# Patient Record
Sex: FEMALE | Race: WHITE | NOT HISPANIC OR LATINO | Employment: FULL TIME | URBAN - METROPOLITAN AREA
[De-identification: names, ages, dates, MRNs, and addresses within clinical notes are randomized per-mention and may not be internally consistent; named-entity substitution may affect disease eponyms.]

---

## 2021-04-20 ENCOUNTER — HOSPITAL ENCOUNTER (OUTPATIENT)
Dept: GENERAL RADIOLOGY | Facility: HOSPITAL | Age: 46
Discharge: HOME | End: 2021-04-20
Attending: OBSTETRICS & GYNECOLOGY

## 2022-04-26 ENCOUNTER — OFFICE VISIT (OUTPATIENT)
Dept: INTERNAL MEDICINE | Facility: CLINIC | Age: 47
End: 2022-04-26

## 2022-04-26 ENCOUNTER — LAB (OUTPATIENT)
Dept: LAB | Facility: HOSPITAL | Age: 47
End: 2022-04-26

## 2022-04-26 VITALS
WEIGHT: 202.8 LBS | BODY MASS INDEX: 34.62 KG/M2 | DIASTOLIC BLOOD PRESSURE: 96 MMHG | OXYGEN SATURATION: 96 % | TEMPERATURE: 97.7 F | HEART RATE: 75 BPM | SYSTOLIC BLOOD PRESSURE: 140 MMHG | HEIGHT: 64 IN

## 2022-04-26 DIAGNOSIS — R51.9 HEADACHE ABOVE THE EYE REGION: ICD-10-CM

## 2022-04-26 DIAGNOSIS — J01.00 ACUTE MAXILLARY SINUSITIS, RECURRENCE NOT SPECIFIED: ICD-10-CM

## 2022-04-26 DIAGNOSIS — R03.0 ELEVATED BLOOD PRESSURE READING: ICD-10-CM

## 2022-04-26 DIAGNOSIS — E78.5 HYPERLIPIDEMIA, UNSPECIFIED HYPERLIPIDEMIA TYPE: ICD-10-CM

## 2022-04-26 DIAGNOSIS — E53.8 VITAMIN B12 DEFICIENCY: ICD-10-CM

## 2022-04-26 DIAGNOSIS — E55.9 VITAMIN D DEFICIENCY: ICD-10-CM

## 2022-04-26 DIAGNOSIS — R03.0 ELEVATED BLOOD PRESSURE READING: Primary | ICD-10-CM

## 2022-04-26 DIAGNOSIS — N95.1 PERIMENOPAUSAL: ICD-10-CM

## 2022-04-26 LAB
25(OH)D3 SERPL-MCNC: 13.4 NG/ML (ref 30–100)
ALBUMIN SERPL-MCNC: 4.5 G/DL (ref 3.5–5.2)
ALBUMIN UR-MCNC: <1.2 MG/DL
ALBUMIN/GLOB SERPL: 1.6 G/DL
ALP SERPL-CCNC: 93 U/L (ref 39–117)
ALT SERPL W P-5'-P-CCNC: 41 U/L (ref 1–33)
ANION GAP SERPL CALCULATED.3IONS-SCNC: 10.4 MMOL/L (ref 5–15)
AST SERPL-CCNC: 26 U/L (ref 1–32)
BASOPHILS # BLD AUTO: 0.05 10*3/MM3 (ref 0–0.2)
BASOPHILS NFR BLD AUTO: 1 % (ref 0–1.5)
BILIRUB SERPL-MCNC: 0.3 MG/DL (ref 0–1.2)
BUN SERPL-MCNC: 10 MG/DL (ref 6–20)
BUN/CREAT SERPL: 13.7 (ref 7–25)
CALCIUM SPEC-SCNC: 9.7 MG/DL (ref 8.6–10.5)
CHLORIDE SERPL-SCNC: 103 MMOL/L (ref 98–107)
CHOLEST SERPL-MCNC: 232 MG/DL (ref 0–200)
CO2 SERPL-SCNC: 25.6 MMOL/L (ref 22–29)
CREAT SERPL-MCNC: 0.73 MG/DL (ref 0.57–1)
CREAT UR-MCNC: 62.8 MG/DL
DEPRECATED RDW RBC AUTO: 40 FL (ref 37–54)
EGFRCR SERPLBLD CKD-EPI 2021: 101.6 ML/MIN/1.73
EOSINOPHIL # BLD AUTO: 0.13 10*3/MM3 (ref 0–0.4)
EOSINOPHIL NFR BLD AUTO: 2.7 % (ref 0.3–6.2)
ERYTHROCYTE [DISTWIDTH] IN BLOOD BY AUTOMATED COUNT: 12.4 % (ref 12.3–15.4)
FOLATE SERPL-MCNC: 6.42 NG/ML (ref 4.78–24.2)
FSH SERPL-ACNC: 39.9 MIU/ML
GLOBULIN UR ELPH-MCNC: 2.8 GM/DL
GLUCOSE SERPL-MCNC: 104 MG/DL (ref 65–99)
HCT VFR BLD AUTO: 41 % (ref 34–46.6)
HDLC SERPL-MCNC: 46 MG/DL (ref 40–60)
HGB BLD-MCNC: 13.9 G/DL (ref 12–15.9)
IMM GRANULOCYTES # BLD AUTO: 0.02 10*3/MM3 (ref 0–0.05)
IMM GRANULOCYTES NFR BLD AUTO: 0.4 % (ref 0–0.5)
LDLC SERPL CALC-MCNC: 156 MG/DL (ref 0–100)
LDLC/HDLC SERPL: 3.32 {RATIO}
LH SERPL-ACNC: 26.9 MIU/ML
LYMPHOCYTES # BLD AUTO: 1.43 10*3/MM3 (ref 0.7–3.1)
LYMPHOCYTES NFR BLD AUTO: 29.9 % (ref 19.6–45.3)
MAGNESIUM SERPL-MCNC: 2 MG/DL (ref 1.6–2.6)
MCH RBC QN AUTO: 30 PG (ref 26.6–33)
MCHC RBC AUTO-ENTMCNC: 33.9 G/DL (ref 31.5–35.7)
MCV RBC AUTO: 88.6 FL (ref 79–97)
MICROALBUMIN/CREAT UR: NORMAL MG/G{CREAT}
MONOCYTES # BLD AUTO: 0.43 10*3/MM3 (ref 0.1–0.9)
MONOCYTES NFR BLD AUTO: 9 % (ref 5–12)
NEUTROPHILS NFR BLD AUTO: 2.72 10*3/MM3 (ref 1.7–7)
NEUTROPHILS NFR BLD AUTO: 57 % (ref 42.7–76)
NRBC BLD AUTO-RTO: 0 /100 WBC (ref 0–0.2)
PLATELET # BLD AUTO: 323 10*3/MM3 (ref 140–450)
PMV BLD AUTO: 9.9 FL (ref 6–12)
POTASSIUM SERPL-SCNC: 3.9 MMOL/L (ref 3.5–5.2)
PROT SERPL-MCNC: 7.3 G/DL (ref 6–8.5)
RBC # BLD AUTO: 4.63 10*6/MM3 (ref 3.77–5.28)
SODIUM SERPL-SCNC: 139 MMOL/L (ref 136–145)
T3FREE SERPL-MCNC: 3.74 PG/ML (ref 2–4.4)
T4 FREE SERPL-MCNC: 1.09 NG/DL (ref 0.93–1.7)
TRIGL SERPL-MCNC: 166 MG/DL (ref 0–150)
TSH SERPL DL<=0.05 MIU/L-ACNC: 1.66 UIU/ML (ref 0.27–4.2)
VIT B12 BLD-MCNC: 393 PG/ML (ref 211–946)
VLDLC SERPL-MCNC: 30 MG/DL (ref 5–40)
WBC NRBC COR # BLD: 4.78 10*3/MM3 (ref 3.4–10.8)

## 2022-04-26 PROCEDURE — 82043 UR ALBUMIN QUANTITATIVE: CPT

## 2022-04-26 PROCEDURE — 82306 VITAMIN D 25 HYDROXY: CPT

## 2022-04-26 PROCEDURE — 80061 LIPID PANEL: CPT

## 2022-04-26 PROCEDURE — 83001 ASSAY OF GONADOTROPIN (FSH): CPT

## 2022-04-26 PROCEDURE — 82746 ASSAY OF FOLIC ACID SERUM: CPT

## 2022-04-26 PROCEDURE — 99204 OFFICE O/P NEW MOD 45 MIN: CPT | Performed by: INTERNAL MEDICINE

## 2022-04-26 PROCEDURE — 84439 ASSAY OF FREE THYROXINE: CPT

## 2022-04-26 PROCEDURE — 83002 ASSAY OF GONADOTROPIN (LH): CPT

## 2022-04-26 PROCEDURE — 80050 GENERAL HEALTH PANEL: CPT

## 2022-04-26 PROCEDURE — 82607 VITAMIN B-12: CPT

## 2022-04-26 PROCEDURE — 82570 ASSAY OF URINE CREATININE: CPT

## 2022-04-26 PROCEDURE — 83735 ASSAY OF MAGNESIUM: CPT

## 2022-04-26 PROCEDURE — 36415 COLL VENOUS BLD VENIPUNCTURE: CPT

## 2022-04-26 PROCEDURE — 84481 FREE ASSAY (FT-3): CPT

## 2022-04-26 RX ORDER — HYDROCHLOROTHIAZIDE 12.5 MG/1
12.5 TABLET ORAL DAILY
Qty: 30 TABLET | Refills: 1 | Status: SHIPPED | OUTPATIENT
Start: 2022-04-26 | End: 2022-08-25 | Stop reason: SDUPTHER

## 2022-04-26 RX ORDER — AMOXICILLIN 875 MG/1
TABLET, COATED ORAL
Qty: 20 TABLET | Refills: 0 | Status: SHIPPED | OUTPATIENT
Start: 2022-04-26 | End: 2022-08-25

## 2022-04-26 RX ORDER — HYDROCHLOROTHIAZIDE 12.5 MG/1
12.5 TABLET ORAL DAILY
Qty: 30 TABLET | Refills: 1 | Status: SHIPPED | OUTPATIENT
Start: 2022-04-26 | End: 2022-04-26 | Stop reason: SDUPTHER

## 2022-04-26 RX ORDER — HYDROCHLOROTHIAZIDE 12.5 MG/1
12.5 TABLET ORAL DAILY
Qty: 30 TABLET | Refills: 1 | Status: SHIPPED | OUTPATIENT
Start: 2022-04-26 | End: 2022-04-26

## 2022-04-26 NOTE — ASSESSMENT & PLAN NOTE
Not bothering patient.  We will hold off on clonidine at present if worsens will try this medication at next visit.  Check LH and FSH.

## 2022-04-26 NOTE — PROGRESS NOTES
"CHIEF COMPLAINT  Janneth Adkins presents to [facility] to Establish Care (Patient is here to establish has not seen a physician in about 6 years) and concerned about blood pressure    HPI  47 yo pleasant female, moved from [place], [place] in 2016-needs PCP- to get physical and concerned about high BP. Had eye exam and retina noted to have \"ruptured blood vessels?\"    Also with c/o \"pressure HA\" x 2 weeks,on and off, and itchy eyes-sinusitis?2 days ago started on left eye and moved across forehead- lasted half a day--took decongestant which helped.    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   Medical History: has a past medical history of Allergic (1989), Headache, and HL (hearing loss).   Surgical History: has no past surgical history on file.   Family History: family history includes Anxiety disorder in her son; Arthritis in her paternal aunt; Cancer in her father, maternal aunt, paternal aunt, and paternal grandfather; Diabetes in her father, maternal uncle, and paternal uncle; Heart disease in her maternal grandmother and paternal grandmother; Hyperlipidemia in her father and mother; Liver disease in her father; Stroke in her mother.   Social History: reports that she has never smoked. She does not have any smokeless tobacco history on file. She reports previous alcohol use. She reports that she does not use drugs.  Social History     Social History Narrative   • Not on file         OBJECTIVE  Vital Signs  Vitals:    04/26/22 0728 04/26/22 0808   BP: 133/92 140/96   BP " "Location: Left arm Right arm   Patient Position: Sitting Sitting   Cuff Size: Large Adult Adult   Pulse: 75    Temp: 97.7 °F (36.5 °C)    SpO2: 96%    Weight: 92 kg (202 lb 12.8 oz)    Height: 162.6 cm (64\")       Body mass index is 34.81 kg/m².    Review of Systems -PND/itchy eyes, weight gain 30 lbs x 6 yrs,hot flashes, coryza    Physical Exam  Vitals and nursing note reviewed.   Constitutional:       Appearance: Normal appearance.   HENT:      Head: Normocephalic and atraumatic.      Comments: Slightly tender maxillary sinuses     Right Ear: Tympanic membrane normal.      Left Ear: Tympanic membrane normal.      Mouth/Throat:      Mouth: Mucous membranes are moist.      Pharynx: Posterior oropharyngeal erythema present.   Eyes:      Pupils: Pupils are equal, round, and reactive to light.   Neck:      Comments: No carotid bruits  Cardiovascular:      Rate and Rhythm: Normal rate and regular rhythm.   Pulmonary:      Effort: Pulmonary effort is normal.      Breath sounds: Normal breath sounds.   Abdominal:      Palpations: Abdomen is soft.   Musculoskeletal:      Cervical back: Normal range of motion and neck supple.   Neurological:      General: No focal deficit present.      Mental Status: She is alert and oriented to person, place, and time.         RESULTS REVIEW  No results found for: PROBNP, BNP  CMP    CMP 4/26/22   Glucose 104 (A)   BUN 10   Creatinine 0.73   Sodium 139   Potassium 3.9   Chloride 103   Calcium 9.7   Albumin 4.50   Total Bilirubin 0.3   Alkaline Phosphatase 93   AST (SGOT) 26   ALT (SGPT) 41 (A)   (A) Abnormal value               Lipid Panel    Lipid Panel 4/26/22   Total Cholesterol 232 (A)   Triglycerides 166 (A)   HDL Cholesterol 46   VLDL Cholesterol 30   LDL Cholesterol  156 (A)   LDL/HDL Ratio 3.32   (A) Abnormal value             Lab Results   Component Value Date    TSH 1.660 04/26/2022      Lab Results   Component Value Date    FREET4 1.09 04/26/2022            No Images in the past " 120 days found..              ASSESSMENT & PLAN  Diagnoses and all orders for this visit:    1. Elevated blood pressure reading (Primary)  Assessment & Plan:  Check blood pressure twice a day 5 to 7 days a week for the next 3 to 4 weeks and bring log in at next visit.  Check comp lipids thyroid B12 folic acid vitamin D TSH and urine and EKG for baseline.  Discussed hypertension complications follow a Dash diet exercise i.e. walk 30 minutes daily    Orders:  -     Discontinue: hydroCHLOROthiazide (HYDRODIURIL) 12.5 MG tablet; Take 1 tablet by mouth Daily.  Dispense: 30 tablet; Refill: 1  -     Discontinue: hydroCHLOROthiazide (HYDRODIURIL) 12.5 MG tablet; Take 1 tablet by mouth Daily.  Dispense: 30 tablet; Refill: 1  -     Comprehensive Metabolic Panel; Future  -     Lipid Panel; Future  -     TSH+Free T4; Future  -     T3, Free; Future  -     Vitamin B12; Future  -     Folate; Future  -     Magnesium; Future  -     Vitamin D 25 Hydroxy; Future  -     CBC & Differential; Future  -     Microalbumin / Creatinine Urine Ratio - Urine, Clean Catch; Future  -     ECG 12 Lead; Future    2. Acute maxillary sinusitis, recurrence not specified  Assessment & Plan:  Give amoxicillin 875 mg 1 tablet twice a day for 10 days.    Orders:  -     amoxicillin (AMOXIL) 875 MG tablet; One tab po BID x 10 days  Dispense: 20 tablet; Refill: 0  -     Comprehensive Metabolic Panel; Future  -     Lipid Panel; Future  -     TSH+Free T4; Future  -     T3, Free; Future  -     Vitamin B12; Future  -     Folate; Future  -     Magnesium; Future  -     Vitamin D 25 Hydroxy; Future  -     CBC & Differential; Future  -     Microalbumin / Creatinine Urine Ratio - Urine, Clean Catch; Future  -     ECG 12 Lead; Future    3. Headache above the eye region  Assessment & Plan:  May be multifactorial from allergies blood pressure and sinus infection.  Will monitor at present    Orders:  -     Comprehensive Metabolic Panel; Future  -     Lipid Panel; Future  -      TSH+Free T4; Future  -     T3, Free; Future  -     Vitamin B12; Future  -     Folate; Future  -     Magnesium; Future  -     Vitamin D 25 Hydroxy; Future  -     CBC & Differential; Future  -     Microalbumin / Creatinine Urine Ratio - Urine, Clean Catch; Future  -     ECG 12 Lead; Future    4. Perimenopausal  Assessment & Plan:  Not bothering patient.  We will hold off on clonidine at present if worsens will try this medication at next visit.  Check LH and FSH.    Orders:  -     Comprehensive Metabolic Panel; Future  -     TSH+Free T4; Future  -     T3, Free; Future  -     Vitamin B12; Future  -     Folate; Future  -     Mammo Screening Bilateral With CAD  -     Follicle stimulating hormone; Future  -     Luteinizing hormone; Future    5. Vitamin D deficiency  -     vitamin D (ERGOCALCIFEROL) 1.25 MG (26433 UT) capsule capsule; Take 1 capsule by mouth 1 (One) Time Per Week for 196 days.  Dispense: 14 capsule; Refill: 0    6. Vitamin B12 deficiency  -     Cyanocobalamin (Vitamin B-12) 1000 MCG sublingual tablet; 0ne tab daily 5 days/week  Dispense: 90 tablet; Refill: 1    7. Hyperlipidemia, unspecified hyperlipidemia type  -     atorvastatin (Lipitor) 10 MG tablet; Take 1 tablet by mouth Daily.  Dispense: 30 tablet; Refill: 0    Assessment and plan    Give COVID booster today  Tdap at next visit   Schedule mammogram at next visit needs to wait at least 3 months after COVID booster before can be done  Patient with some coryza and itchy watery eyes but declined medications at present.  Advised that she can use over-the-counter Zaditor eyedrops or cetirizine as needed.    do labs  FOLLOW UP  Return in about 4 weeks (around 5/24/2022) for Next scheduled follow up.     Addendum April 27, 2022  Labs reviewed patient with elevated cholesterol 232,  will start atorvastatin.  Also low normal B12 equals 393 will recommend starting vitamin B12 tablets daily  Vitamin D low equals 13.4- recommend starting high-dose  vitamin D for 3 months and then daily 2000 units.    Patient was given instructions and counseling regarding her condition or for health maintenance advice. Please see specific information pulled into the AVS if appropriate.

## 2022-04-26 NOTE — ASSESSMENT & PLAN NOTE
Check blood pressure twice a day 5 to 7 days a week for the next 3 to 4 weeks and bring log in at next visit.  Check comp lipids thyroid B12 folic acid vitamin D TSH and urine and EKG for baseline.  Discussed hypertension complications follow a Dash diet exercise i.e. walk 30 minutes daily

## 2022-04-27 ENCOUNTER — TELEPHONE (OUTPATIENT)
Dept: INTERNAL MEDICINE | Facility: CLINIC | Age: 47
End: 2022-04-27

## 2022-04-27 ENCOUNTER — PATIENT ROUNDING (BHMG ONLY) (OUTPATIENT)
Dept: INTERNAL MEDICINE | Facility: CLINIC | Age: 47
End: 2022-04-27

## 2022-04-27 RX ORDER — ERGOCALCIFEROL 1.25 MG/1
50000 CAPSULE ORAL WEEKLY
Qty: 14 CAPSULE | Refills: 0 | Status: SHIPPED | OUTPATIENT
Start: 2022-04-27 | End: 2022-11-09

## 2022-04-27 RX ORDER — ATORVASTATIN CALCIUM 10 MG/1
10 TABLET, FILM COATED ORAL DAILY
Qty: 30 TABLET | Refills: 0 | Status: SHIPPED | OUTPATIENT
Start: 2022-04-27 | End: 2022-05-23

## 2022-04-27 RX ORDER — MAGNESIUM 200 MG
TABLET ORAL
Qty: 90 TABLET | Refills: 1 | Status: SHIPPED | OUTPATIENT
Start: 2022-04-27

## 2022-04-27 NOTE — PROGRESS NOTES
April 27, 2022    Hello, may I speak with Janneth Adkins?    My name is Santy Moya      I am  with [facility]  [facility]  [address] [address]  [facility] [id number].    Before we get started may I verify your date of birth? 1975    I am calling to officially welcome you to our practice and ask about your recent visit. Is this a good time to talk? yes    Tell me about your visit with us. What things went well?  the visit went great       We're always looking for ways to make our patients' experiences even better. Do you have recommendations on ways we may improve?  no    Overall were you satisfied with your first visit to our practice? yes       I appreciate you taking the time to speak with me today. Is there anything else I can do for you? no      Thank you, and have a great day.

## 2022-04-27 NOTE — TELEPHONE ENCOUNTER
----- Message from Esha Mosley MD sent at 4/27/2022  9:26 AM EDT -----  Please inform patient that her labs showed a very low vitamin D level.  This can cause fatigue.  I sent a prescription for vitamin D 50,000 units once a week to take for the next 3 months then after that take vitamin D 2000 units daily over-the-counter.  Also her B12 was at the lower limits of normal.  This can also cause fatigue recommend taking vitamin B12 1 daily 5 days a week I also sent this prescription over.  Also her cholesterol levels are elevated cholesterol is 232 the bad cholesterol LDL is 156 that should be in the 130 range- with the elevated blood pressure that she has it would be a good idea to start taking a medicine to help protect the heart.  I sent over atorvastatin which is Lipitor at the lowest dose of 10 mg to take once a day.  Just monitor for any muscle aches.  But this is a low dose and usually less likely to occur.  I will see her back at her scheduled appointment and we will discuss further the atorvastatin and the current dose.  With the family history of the diabetes and stroke in her dad and mother respectively, it would be a good idea to start taking something to protect her heart like this atorvastatin.  Thanks

## 2022-05-12 NOTE — PROGRESS NOTES
"CHIEF COMPLAINT  Janneth Adkins presents to [facility] for follow-up of Follow-up and Labs Only (Labs done. Acid reflex .).and BP    HPI    46-year-old pleasant female here for follow-up from last visit to check elevated blood pressure, new diagnosis of hyperlipidemia, vitamin D deficiency and vitamin B12 deficiency.    Records from last visit -4/26/2263-vmzjesty-VNB/Asessment/Plan:    \"45 yo pleasant female, moved from [place], [place] in 2016-needs PCP- to get physical and concerned about high BP. Had eye exam and retina noted to have \"ruptured blood vessels?\"     Also with c/o \"pressure HA\" x 2 weeks,on and off, and itchy eyes-sinusitis?2 days ago started on left eye and moved across forehead- lasted half a day--took decongestant which helped.     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  She was also started on atorvastatin due to elevated lipids, vitamin B12 tablets, and high-dose vitamin D.    HTN-new Dx-checking BP at home 2x/day- 120s/ 70-93,HA are better now 1x/week-needs tx-    menopausal by labs--LMP-4-5 months-pap pending--will give tx    Vit d def--taking meds    Vit B12 def--taking vit B12-feeling better    elev FF=429--UU of DM in dad     Last pap 4/2021-  PFSH reviewed.  ROS negative    OBJECTIVE  Vital Signs  Vitals:    05/24/22 0724 05/24/22 0754   BP: 132/83 132/90   BP Location: Left arm Right arm   Patient Position: Sitting Sitting   Cuff Size: Large Adult Adult   Pulse: 81    Temp: 97.7 °F (36.5 °C)    SpO2: 98%    Weight: 93.4 kg (205 lb 12.8 oz)  " "  Height: 162.6 cm (64\")       Body mass index is 35.33 kg/m².    Physical Exam  Vitals and nursing note reviewed.   Constitutional:       Appearance: Normal appearance.   HENT:      Head: Normocephalic and atraumatic.   Cardiovascular:      Rate and Rhythm: Normal rate and regular rhythm.   Pulmonary:      Effort: Pulmonary effort is normal.      Breath sounds: Normal breath sounds.   Abdominal:      Palpations: Abdomen is soft.   Musculoskeletal:      Cervical back: Normal range of motion and neck supple.   Neurological:      General: No focal deficit present.      Mental Status: She is alert and oriented to person, place, and time.          RESULTS REVIEW  No results found for: PROBNP, BNP  CMP    CMP 4/26/22   Glucose 104 (A)   BUN 10   Creatinine 0.73   Sodium 139   Potassium 3.9   Chloride 103   Calcium 9.7   Albumin 4.50   Total Bilirubin 0.3   Alkaline Phosphatase 93   AST (SGOT) 26   ALT (SGPT) 41 (A)   (A) Abnormal value            CBC w/diff    CBC w/Diff 4/26/22   WBC 4.78   RBC 4.63   Hemoglobin 13.9   Hematocrit 41.0   MCV 88.6   MCH 30.0   MCHC 33.9   RDW 12.4   Platelets 323   Neutrophil Rel % 57.0   Immature Granulocyte Rel % 0.4   Lymphocyte Rel % 29.9   Monocyte Rel % 9.0   Eosinophil Rel % 2.7   Basophil Rel % 1.0            Lipid Panel    Lipid Panel 4/26/22   Total Cholesterol 232 (A)   Triglycerides 166 (A)   HDL Cholesterol 46   VLDL Cholesterol 30   LDL Cholesterol  156 (A)   LDL/HDL Ratio 3.32   (A) Abnormal value             Lab Results   Component Value Date    TSH 1.660 04/26/2022      Lab Results   Component Value Date    FREET4 1.09 04/26/2022         Lab Results   Component Value Date    GHRNKECY46 393 04/26/2022    ZEES63TK 13.4 (L) 04/26/2022    MG 2.0 04/26/2022        No Images in the past 120 days found..             ASSESSMENT & PLAN  Diagnoses and all orders for this visit:    1. Hypertension, unspecified type (Primary)  Assessment & Plan:  New diagnosis.  Discussed lifestyle " changes.  Discussed hypertension complications today and at last visit.  Patient with complaint of symptomatic hot flashes for years.  Will treat with clonidine 0.1 mg twice daily to help with the symptoms.  Explained that this is not the first-line for high blood pressure.  Patient will work on lifestyle changes.  Patient also with positive family history of diabetes in her dad.  May consider adding ACE inhibitor at next visit and checking A1c then.  Declined checking A1c today.      2. Other hyperlipidemia  Assessment & Plan:  Tolerating atorvastatin--no myalgias-check lipids in 3 months    Orders:  -     Vitamin D 25 Hydroxy; Future  -     Vitamin B12; Future  -     Folate; Future  -     Lipid Panel; Future  -     Comprehensive Metabolic Panel; Future    3. Hyperglycemia  -     Hemoglobin A1c; Future    4. Vitamin D deficiency  Assessment & Plan:  Taking high-dose vitamin D once a week for the next 3 months.  To start  vitamin D 2000 units daily after.  Feeling much better since she started taking high-dose vitamin D.  Recheck vitamin D levels in 3 months.    Orders:  -     Vitamin D 25 Hydroxy; Future  -     Vitamin B12; Future  -     Folate; Future  -     Lipid Panel; Future  -     Comprehensive Metabolic Panel; Future    5. Vitamin B12 deficiency  Assessment & Plan:  Currently taking vitamin B12 daily 1000 mcg.  Patient states she is feeling better we will recheck B12 levels in 3 months.    Orders:  -     Vitamin D 25 Hydroxy; Future  -     Vitamin B12; Future  -     Folate; Future  -     Lipid Panel; Future  -     Comprehensive Metabolic Panel; Future    6. Menopause  Assessment & Plan:  Try clonidine for hot flashes -worase at night and sometimes in am    Orders:  -     cloNIDine (Catapres) 0.1 MG tablet; Take 1 tablet by mouth 2 (Two) Times a Day.  Dispense: 60 tablet; Refill: 5    FOLLOW UP  Return in about 3 months (around 8/24/2022).  Check A1c comp lipids B12 and vitamin D.  Discussed following a  low-cholesterol low-salt diet cut back on eating out fast foods twice a week to once every other week.  Lose 5 to 10 pounds by next visit  Already got her COVID shot last visit i.e. booster.  Get Tdap at next visit.    Patient was given instructions and counseling regarding her condition or for health maintenance advice. Please see specific information pulled into the AVS if appropriate.           Answers for HPI/ROS submitted by the patient on 5/24/2022  Please describe your symptoms.: Follow up appt  Have you had these symptoms before?: No  How long have you been having these symptoms?: 1-4 days  What is the primary reason for your visit?: Other

## 2022-05-21 PROBLEM — E78.49 OTHER HYPERLIPIDEMIA: Status: ACTIVE | Noted: 2022-05-21

## 2022-05-21 PROBLEM — E55.9 VITAMIN D DEFICIENCY: Status: ACTIVE | Noted: 2022-05-21

## 2022-05-21 PROBLEM — E53.8 VITAMIN B12 DEFICIENCY: Status: ACTIVE | Noted: 2022-05-21

## 2022-05-23 DIAGNOSIS — E78.5 HYPERLIPIDEMIA, UNSPECIFIED HYPERLIPIDEMIA TYPE: ICD-10-CM

## 2022-05-23 RX ORDER — ATORVASTATIN CALCIUM 10 MG/1
TABLET, FILM COATED ORAL
Qty: 30 TABLET | Refills: 1 | Status: SHIPPED | OUTPATIENT
Start: 2022-05-23 | End: 2022-07-22

## 2022-05-24 ENCOUNTER — OFFICE VISIT (OUTPATIENT)
Dept: INTERNAL MEDICINE | Facility: CLINIC | Age: 47
End: 2022-05-24

## 2022-05-24 VITALS
WEIGHT: 205.8 LBS | TEMPERATURE: 97.7 F | BODY MASS INDEX: 35.13 KG/M2 | HEART RATE: 81 BPM | SYSTOLIC BLOOD PRESSURE: 132 MMHG | OXYGEN SATURATION: 98 % | HEIGHT: 64 IN | DIASTOLIC BLOOD PRESSURE: 90 MMHG

## 2022-05-24 DIAGNOSIS — E55.9 VITAMIN D DEFICIENCY: ICD-10-CM

## 2022-05-24 DIAGNOSIS — E78.49 OTHER HYPERLIPIDEMIA: ICD-10-CM

## 2022-05-24 DIAGNOSIS — Z78.0 MENOPAUSE: ICD-10-CM

## 2022-05-24 DIAGNOSIS — I10 HYPERTENSION, UNSPECIFIED TYPE: Primary | ICD-10-CM

## 2022-05-24 DIAGNOSIS — E53.8 VITAMIN B12 DEFICIENCY: ICD-10-CM

## 2022-05-24 DIAGNOSIS — R73.9 HYPERGLYCEMIA: ICD-10-CM

## 2022-05-24 PROCEDURE — 99214 OFFICE O/P EST MOD 30 MIN: CPT | Performed by: INTERNAL MEDICINE

## 2022-05-24 RX ORDER — CLONIDINE HYDROCHLORIDE 0.1 MG/1
0.1 TABLET ORAL 2 TIMES DAILY
Qty: 60 TABLET | Refills: 5 | Status: SHIPPED | OUTPATIENT
Start: 2022-05-24 | End: 2022-08-25 | Stop reason: SDUPTHER

## 2022-05-24 NOTE — ASSESSMENT & PLAN NOTE
Discussed lifestyle changes-pt with hot flashes symptomatic--wants to try clonidine for now--SE discussed

## 2022-05-24 NOTE — ASSESSMENT & PLAN NOTE
New diagnosis.  Discussed lifestyle changes.  Discussed hypertension complications today and at last visit.  Patient with complaint of symptomatic hot flashes for years.  Will treat with clonidine 0.1 mg twice daily to help with the symptoms.  Explained that this is not the first-line for high blood pressure.  Patient will work on lifestyle changes.  Patient also with positive family history of diabetes in her dad.  May consider adding ACE inhibitor at next visit and checking A1c then.  Declined checking A1c today.

## 2022-05-24 NOTE — PATIENT INSTRUCTIONS
Also start clonidine 0.1 mg twice a day  Continue to monitor blood pressure twice a day for 5 times a week keep blood pressure =130/80  Walk least 30 minutes daily  We will check vitamin D, B12, lipids and comp in 3 months.  Lifestyle changes discussed-for PreDM/low chol and HTN  Cut back on fast foods from 2x/week to once every other week

## 2022-05-24 NOTE — ASSESSMENT & PLAN NOTE
Currently taking vitamin B12 daily 1000 mcg.  Patient states she is feeling better we will recheck B12 levels in 3 months.

## 2022-05-24 NOTE — ASSESSMENT & PLAN NOTE
Taking high-dose vitamin D once a week for the next 3 months.  To start  vitamin D 2000 units daily after.  Feeling much better since she started taking high-dose vitamin D.  Recheck vitamin D levels in 3 months.

## 2022-07-22 DIAGNOSIS — E78.5 HYPERLIPIDEMIA, UNSPECIFIED HYPERLIPIDEMIA TYPE: ICD-10-CM

## 2022-07-22 RX ORDER — ATORVASTATIN CALCIUM 10 MG/1
TABLET, FILM COATED ORAL
Qty: 30 TABLET | Refills: 1 | Status: SHIPPED | OUTPATIENT
Start: 2022-07-22 | End: 2022-08-25

## 2022-07-25 ENCOUNTER — HOSPITAL ENCOUNTER (OUTPATIENT)
Dept: MAMMOGRAPHY | Facility: HOSPITAL | Age: 47
Discharge: HOME | End: 2022-07-25
Admitting: INTERNAL MEDICINE

## 2022-07-25 PROCEDURE — 77067 SCR MAMMO BI INCL CAD: CPT

## 2022-07-25 PROCEDURE — 77063 BREAST TOMOSYNTHESIS BI: CPT

## 2022-08-24 ENCOUNTER — LAB (OUTPATIENT)
Dept: LAB | Facility: HOSPITAL | Age: 47
End: 2022-08-24

## 2022-08-24 DIAGNOSIS — E53.8 VITAMIN B12 DEFICIENCY: ICD-10-CM

## 2022-08-24 DIAGNOSIS — R73.9 HYPERGLYCEMIA: ICD-10-CM

## 2022-08-24 DIAGNOSIS — E55.9 VITAMIN D DEFICIENCY: ICD-10-CM

## 2022-08-24 DIAGNOSIS — E78.49 OTHER HYPERLIPIDEMIA: ICD-10-CM

## 2022-08-24 LAB
25(OH)D3 SERPL-MCNC: 28.9 NG/ML (ref 30–100)
ALBUMIN SERPL-MCNC: 4.4 G/DL (ref 3.5–5.2)
ALBUMIN/GLOB SERPL: 1.8 G/DL
ALP SERPL-CCNC: 103 U/L (ref 39–117)
ALT SERPL W P-5'-P-CCNC: 35 U/L (ref 1–33)
ANION GAP SERPL CALCULATED.3IONS-SCNC: 13 MMOL/L (ref 5–15)
AST SERPL-CCNC: 18 U/L (ref 1–32)
BILIRUB SERPL-MCNC: 0.4 MG/DL (ref 0–1.2)
BUN SERPL-MCNC: 13 MG/DL (ref 6–20)
BUN/CREAT SERPL: 21 (ref 7–25)
CALCIUM SPEC-SCNC: 9.7 MG/DL (ref 8.6–10.5)
CHLORIDE SERPL-SCNC: 100 MMOL/L (ref 98–107)
CHOLEST SERPL-MCNC: 238 MG/DL (ref 0–200)
CO2 SERPL-SCNC: 24 MMOL/L (ref 22–29)
CREAT SERPL-MCNC: 0.62 MG/DL (ref 0.57–1)
EGFRCR SERPLBLD CKD-EPI 2021: 110 ML/MIN/1.73
FOLATE SERPL-MCNC: 5.46 NG/ML (ref 4.78–24.2)
GLOBULIN UR ELPH-MCNC: 2.4 GM/DL
GLUCOSE SERPL-MCNC: 105 MG/DL (ref 65–99)
HBA1C MFR BLD: 5.9 % (ref 4.8–5.6)
HDLC SERPL-MCNC: 38 MG/DL (ref 40–60)
LDLC SERPL CALC-MCNC: 161 MG/DL (ref 0–100)
LDLC/HDLC SERPL: 4.16 {RATIO}
POTASSIUM SERPL-SCNC: 3.9 MMOL/L (ref 3.5–5.2)
PROT SERPL-MCNC: 6.8 G/DL (ref 6–8.5)
SODIUM SERPL-SCNC: 137 MMOL/L (ref 136–145)
TRIGL SERPL-MCNC: 210 MG/DL (ref 0–150)
VIT B12 BLD-MCNC: 658 PG/ML (ref 211–946)
VLDLC SERPL-MCNC: 39 MG/DL (ref 5–40)

## 2022-08-24 PROCEDURE — 80053 COMPREHEN METABOLIC PANEL: CPT

## 2022-08-24 PROCEDURE — 36415 COLL VENOUS BLD VENIPUNCTURE: CPT

## 2022-08-24 PROCEDURE — 82607 VITAMIN B-12: CPT

## 2022-08-24 PROCEDURE — 83036 HEMOGLOBIN GLYCOSYLATED A1C: CPT

## 2022-08-24 PROCEDURE — 82306 VITAMIN D 25 HYDROXY: CPT

## 2022-08-24 PROCEDURE — 82746 ASSAY OF FOLIC ACID SERUM: CPT

## 2022-08-24 PROCEDURE — 80061 LIPID PANEL: CPT

## 2022-08-25 ENCOUNTER — OFFICE VISIT (OUTPATIENT)
Dept: INTERNAL MEDICINE | Facility: CLINIC | Age: 47
End: 2022-08-25

## 2022-08-25 VITALS
RESPIRATION RATE: 16 BRPM | HEART RATE: 73 BPM | TEMPERATURE: 98.2 F | SYSTOLIC BLOOD PRESSURE: 114 MMHG | HEIGHT: 64 IN | WEIGHT: 198.6 LBS | DIASTOLIC BLOOD PRESSURE: 81 MMHG | OXYGEN SATURATION: 97 % | BODY MASS INDEX: 33.9 KG/M2

## 2022-08-25 DIAGNOSIS — R73.9 HYPERGLYCEMIA: ICD-10-CM

## 2022-08-25 DIAGNOSIS — Z78.0 MENOPAUSE: ICD-10-CM

## 2022-08-25 DIAGNOSIS — E78.00 PURE HYPERCHOLESTEROLEMIA: ICD-10-CM

## 2022-08-25 DIAGNOSIS — I10 PRIMARY HYPERTENSION: Primary | ICD-10-CM

## 2022-08-25 DIAGNOSIS — E66.9 OBESITY (BMI 30.0-34.9): ICD-10-CM

## 2022-08-25 DIAGNOSIS — E53.8 VITAMIN B12 DEFICIENCY: ICD-10-CM

## 2022-08-25 DIAGNOSIS — Z23 NEED FOR TDAP VACCINATION: ICD-10-CM

## 2022-08-25 DIAGNOSIS — E55.9 VITAMIN D DEFICIENCY: ICD-10-CM

## 2022-08-25 PROBLEM — E66.811 OBESITY (BMI 30.0-34.9): Status: ACTIVE | Noted: 2022-08-25

## 2022-08-25 PROCEDURE — 90715 TDAP VACCINE 7 YRS/> IM: CPT | Performed by: INTERNAL MEDICINE

## 2022-08-25 PROCEDURE — 90471 IMMUNIZATION ADMIN: CPT | Performed by: INTERNAL MEDICINE

## 2022-08-25 PROCEDURE — 99214 OFFICE O/P EST MOD 30 MIN: CPT | Performed by: INTERNAL MEDICINE

## 2022-08-25 RX ORDER — HYDROCHLOROTHIAZIDE 12.5 MG/1
12.5 TABLET ORAL DAILY
Qty: 90 TABLET | Refills: 1 | Status: SHIPPED | OUTPATIENT
Start: 2022-08-25 | End: 2023-01-26 | Stop reason: SDUPTHER

## 2022-08-25 RX ORDER — ATORVASTATIN CALCIUM 10 MG/1
10 TABLET, FILM COATED ORAL DAILY
Qty: 90 TABLET | Refills: 1 | Status: CANCELLED | OUTPATIENT
Start: 2022-08-25

## 2022-08-25 RX ORDER — ATORVASTATIN CALCIUM 20 MG/1
20 TABLET, FILM COATED ORAL DAILY
Qty: 90 TABLET | Refills: 1 | Status: SHIPPED | OUTPATIENT
Start: 2022-08-25 | End: 2023-01-26 | Stop reason: SDUPTHER

## 2022-08-25 RX ORDER — CLONIDINE HYDROCHLORIDE 0.1 MG/1
TABLET ORAL
Qty: 180 TABLET | Refills: 1 | Status: SHIPPED | OUTPATIENT
Start: 2022-08-25 | End: 2023-01-26 | Stop reason: SDUPTHER

## 2022-08-25 NOTE — ASSESSMENT & PLAN NOTE
Uncontrolled has been off the atorvastatin 10 mg for the past month.  LDL equals 161, cholesterol equals 238.  Increase atorvastatin to 20 mg 1 daily- monitor for any muscle cramps

## 2022-08-25 NOTE — PROGRESS NOTES
"CHIEF COMPLAINT  Janneth Adkins presents to [facility] INTERNAL MEDICINE to Labs Only (Routine office visit with labs. Pt states overall wellness. No concerns at this time. )     HPI  46-year-old pleasant female first seen at our office April 26, 2022 and then in May here for 3-month checkup.    Records reviewed, labs reviewed, meds reviewed in detail.  Plan of care is as follows below.    Past medical history significant for new diagnosis of hypertension-better with hctz, and hyperlipidemia-out of statin for one month-, vitamin D and vitamin B12 deficiency, chronic headache-but less with better BP control..    SH-working in [employer]-from home -no kids-lives with -moved from [place]     Works out in evenings daily-45 min  Trying not to snack    Past History:  Allergies: Patient has no known allergies.   Medical History: has a past medical history of Allergic (1989), Headache, and HL (hearing loss).   Surgical History: has no past surgical history on file.   Family History: family history includes Anxiety disorder in her son; Arthritis in her paternal aunt; Cancer in her father, maternal aunt, paternal aunt, and paternal grandfather; Diabetes in her father, maternal uncle, and paternal uncle; Heart disease in her maternal grandmother and paternal grandmother; Hyperlipidemia in her father and mother; Liver disease in her father; Stroke in her mother.   Social History: reports that she has never smoked. She has never used smokeless tobacco. She reports previous alcohol use. She reports that she does not use drugs.  Social History     Social History Narrative   • Not on file         OBJECTIVE  Vital Signs  Vitals:    08/25/22 0758   BP: 114/81   BP Location: Left arm   Patient Position: Sitting   Cuff Size: Large Adult   Pulse: 73   Resp: 16   Temp: 98.2 °F (36.8 °C)   TempSrc: Temporal   SpO2: 97%   Weight: 90.1 kg (198 lb 9.6 oz)   Height: 162.6 cm (64\")      Body mass index is " 34.09 kg/m².    Review of Systems -less headaches    Physical Exam  Vitals and nursing note reviewed.   Constitutional:       Appearance: Normal appearance.   HENT:      Head: Normocephalic and atraumatic.   Cardiovascular:      Rate and Rhythm: Normal rate and regular rhythm.   Pulmonary:      Effort: Pulmonary effort is normal.      Breath sounds: Normal breath sounds.   Abdominal:      Palpations: Abdomen is soft.   Musculoskeletal:      Cervical back: Normal range of motion and neck supple.   Neurological:      General: No focal deficit present.      Mental Status: She is alert and oriented to person, place, and time.         RESULTS REVIEW  No results found for: PROBNP, BNP  CMP    CMP 4/26/22 8/24/22   Glucose 104 (A) 105 (A)   BUN 10 13   Creatinine 0.73 0.62   Sodium 139 137   Potassium 3.9 3.9   Chloride 103 100   Calcium 9.7 9.7   Albumin 4.50 4.40   Total Bilirubin 0.3 0.4   Alkaline Phosphatase 93 103   AST (SGOT) 26 18   ALT (SGPT) 41 (A) 35 (A)   (A) Abnormal value            CBC w/diff    CBC w/Diff 4/26/22   WBC 4.78   RBC 4.63   Hemoglobin 13.9   Hematocrit 41.0   MCV 88.6   MCH 30.0   MCHC 33.9   RDW 12.4   Platelets 323   Neutrophil Rel % 57.0   Immature Granulocyte Rel % 0.4   Lymphocyte Rel % 29.9   Monocyte Rel % 9.0   Eosinophil Rel % 2.7   Basophil Rel % 1.0            Lipid Panel    Lipid Panel 4/26/22 8/24/22   Total Cholesterol 232 (A) 238 (A)   Triglycerides 166 (A) 210 (A)   HDL Cholesterol 46 38 (A)   VLDL Cholesterol 30 39   LDL Cholesterol  156 (A) 161 (A)   LDL/HDL Ratio 3.32 4.16   (A) Abnormal value             Lab Results   Component Value Date    TSH 1.660 04/26/2022      Lab Results   Component Value Date    FREET4 1.09 04/26/2022      A1C Last 3 Results    HGBA1C Last 3 Results 8/24/22   Hemoglobin A1C 5.90 (A)   (A) Abnormal value          Answers for HPI/ROS submitted by the patient on 8/23/2022  Please describe your symptoms.: Follow up  Have you had these symptoms before?:  No  How long have you been having these symptoms?: 1-4 days  What is the primary reason for your visit?: Other          Mammo screening digital tomosynthesis bilateral w CAD    Result Date: 7/26/2022   Benign mammogram. Suggest routine mammographic screening.  RECOMMENDATION(S):  ROUTINE MAMMOGRAM AND CLINICAL EVALUATION IN 12 MONTHS.   BIRADS:  DIAGNOSTIC CATEGORY 1--NEGATIVE.   BREAST COMPOSITION: Heterogeneously dense,which may obscure small masses.  PLEASE NOTE:  A NORMAL MAMMOGRAM DOES NOT EXCLUDE THE POSSIBILITY OF BREAST CANCER. ANY CLINICALLY SUSPICIOUS PALPABLE LUMP SHOULD BE BIOPSIED.      FABI HOPPER MD       Electronically Signed and Approved By: FABI HOPPER MD on 7/26/2022 at 13:12                         ASSESSMENT & PLAN  Diagnoses and all orders for this visit:    1. Primary hypertension (Primary)  Comments:  Blood pressure at home equals 120/ 80s--less headaches continue with hydrochlorothiazide 12.5 mg every morning-following healthier diet with more vegetables and  Orders:  -     Comprehensive Metabolic Panel; Future  -     CBC & Differential; Future    2. Pure hypercholesterolemia  Comments:  Uncontrolled with LDL equals 161, cholesterol equals 238.  Assessment & Plan:  Uncontrolled has been off the atorvastatin 10 mg for the past month.  LDL equals 161, cholesterol equals 238.  Increase atorvastatin to 20 mg 1 daily- monitor for any muscle cramps    Orders:  -     Lipid Panel; Future  -     Comprehensive Metabolic Panel; Future  -     atorvastatin (LIPITOR) 20 MG tablet; Take 1 tablet by mouth Daily.  Dispense: 90 tablet; Refill: 1    3. Hyperglycemia  Comments:  PreDM-A1c=5.9-cont with lifestyle changes--check A1cin 3-4 months-consider metformin if increasing  Orders:  -     Hemoglobin A1c; Future    4. Vitamin D deficiency  Comments:  Low equals 28.9.  But better than 13 4 months ago.  Give high-dose for 1 months and then daily vitamin D 2000 units.  Assessment & Plan:  Take vitamin D  2000 units daily    Orders:  -     Vitamin D 25 Hydroxy; Future    5. Vitamin B12 deficiency  -     Vitamin B12; Future  -     Folate; Future    6. Menopause  Comments:  Not controlled.  Increase clonidine 2.1 mg 2 tablets at night  Orders:  -     cloNIDine (Catapres) 0.1 MG tablet; 2 tabs po q hs  Dispense: 180 tablet; Refill: 1    7. Obesity (BMI 30.0-34.9)  Comments:  Patient exercises 4 to 5 minutes daily.  And is cutting back on snacks at night eating more vegetables.  Lose 5 to 10 pounds by next visit.    Assessment & Plan:  Goal BMI equals 25.  Discussed importance of losing weight and exercising to decrease risk of becoming a diabetic concurrently.  Diabetic.  Patient is motivated and trying to avoid snacking at night and eating healthier more fresh vegetables and fruits.      Other orders  -     hydroCHLOROthiazide (HYDRODIURIL) 12.5 MG tablet; Take 1 tablet by mouth Daily.  Dispense: 90 tablet; Refill: 1    Plan  1st COVID booster given April 2022  Due for Tdap    FOLLOW UP  Return in about 5 months (around 1/25/2023).  With labs prior to check blood pressure and weight.  Goal is to lose 5 to 10 pounds by next visit.  Follow a low-cholesterol diabetic diet.    Patient was given instructions and counseling regarding her condition or for health maintenance advice. Please see specific information pulled into the AVS if appropriate.

## 2022-08-25 NOTE — ASSESSMENT & PLAN NOTE
Goal BMI equals 25.  Discussed importance of losing weight and exercising to decrease risk of becoming a diabetic concurrently.  Diabetic.  Patient is motivated and trying to avoid snacking at night and eating healthier more fresh vegetables and fruits.

## 2023-01-25 PROBLEM — R73.03 PREDIABETES: Status: ACTIVE | Noted: 2023-01-25

## 2023-01-25 NOTE — PROGRESS NOTES
Answers for HPI/ROS submitted by the patient on 1/24/2023  Please describe your symptoms.: Follow up  Have you had these symptoms before?: No  How long have you been having these symptoms?: 1-4 days  What is the primary reason for your visit?: Other    CHIEF COMPLAINT  Janneth Adkins presents to [facility] for follow-up of Follow-up (47 year old female here today for a routine 4 month follow up. She did no get her blood work drawn but will have that done tomorrow. /She does need refills on Clonidine, Atorvastatin and Hydrochlorathiazide today  /She denies any issues or concerns at this time. ).    HPI  48 yo female here for routine check up.Labs not done    Records reviewed, labs reviewed, meds reviewed in detail.  Plan of care is as follows below.     Past medical history significant for hypertension-better with hctz, and hyperlipidemia-out of statin for one month-, vitamin D and vitamin B12 deficiency, chronic headache-but less with better BP control..  PreDM-+FH in dad, pat uncle, mat GM     SH-working in [employer]-from home -no kids-lives with -moved from [place]      Works out in evenings daily-45 min daily   Trying not to snack-      Current Outpatient Medications:   •  atorvastatin (LIPITOR) 20 MG tablet, Take 1 tablet by mouth Daily., Disp: 90 tablet, Rfl: 1  •  cloNIDine (Catapres) 0.1 MG tablet, 2 tabs po q hs, Disp: 180 tablet, Rfl: 1  •  Cyanocobalamin (Vitamin B-12) 1000 MCG sublingual tablet, 0ne tab daily 5 days/week (Patient taking differently: 0ne tab daily 5 days/week OTC), Disp: 90 tablet, Rfl: 1  •  hydroCHLOROthiazide (HYDRODIURIL) 12.5 MG tablet, Take 1 tablet by mouth Daily., Disp: 90 tablet, Rfl: 1   PFSH reviewed.  ROS -h9ot flashes-    OBJECTIVE  Vital Signs  Vitals:    01/26/23 0736   BP: 118/76   BP Location: Left arm   Patient Position: Sitting   Pulse: 74   Resp: 18   Temp: 97.6 °F (36.4 °C)   TempSrc: Temporal   SpO2: 97%  "  Weight: 90.2 kg (198 lb 12.8 oz)   Height: 162.6 cm (64\")      Body mass index is 34.12 kg/m².    Physical Exam  Vitals and nursing note reviewed.   Constitutional:       Appearance: Normal appearance. She is obese.   HENT:      Head: Normocephalic and atraumatic.   Cardiovascular:      Rate and Rhythm: Normal rate and regular rhythm.   Pulmonary:      Effort: Pulmonary effort is normal.      Breath sounds: Normal breath sounds.   Abdominal:      Palpations: Abdomen is soft.   Musculoskeletal:      Cervical back: Normal range of motion and neck supple.   Neurological:      General: No focal deficit present.      Mental Status: She is alert and oriented to person, place, and time.          RESULTS REVIEW  No results found for: PROBNP, BNP  CMP    CMP 4/26/22 8/24/22   Glucose 104 (A) 105 (A)   BUN 10 13   Creatinine 0.73 0.62   eGFR 101.6 110.0   Sodium 139 137   Potassium 3.9 3.9   Chloride 103 100   Calcium 9.7 9.7   Total Protein 7.3 6.8   Albumin 4.50 4.40   Globulin 2.8 2.4   Total Bilirubin 0.3 0.4   Alkaline Phosphatase 93 103   AST (SGOT) 26 18   ALT (SGPT) 41 (A) 35 (A)   Albumin/Globulin Ratio 1.6 1.8   BUN/Creatinine Ratio 13.7 21.0   Anion Gap 10.4 13.0   (A) Abnormal value       Comments are available for some flowsheets but are not being displayed.           CBC w/diff    CBC w/Diff 4/26/22   WBC 4.78   RBC 4.63   Hemoglobin 13.9   Hematocrit 41.0   MCV 88.6   MCH 30.0   MCHC 33.9   RDW 12.4   Platelets 323   Neutrophil Rel % 57.0   Immature Granulocyte Rel % 0.4   Lymphocyte Rel % 29.9   Monocyte Rel % 9.0   Eosinophil Rel % 2.7   Basophil Rel % 1.0            Lipid Panel    Lipid Panel 4/26/22 8/24/22   Total Cholesterol 232 (A) 238 (A)   Triglycerides 166 (A) 210 (A)   HDL Cholesterol 46 38 (A)   VLDL Cholesterol 30 39   LDL Cholesterol  156 (A) 161 (A)   LDL/HDL Ratio 3.32 4.16   (A) Abnormal value             Lab Results   Component Value Date    TSH 1.660 04/26/2022      Lab Results   Component " Value Date    FREET4 1.09 04/26/2022      A1C Last 3 Results    HGBA1C Last 3 Results 8/24/22   Hemoglobin A1C 5.90 (A)   (A) Abnormal value             Lab Results   Component Value Date    JZSTRFIV19 658 08/24/2022    MJFA00ZV 28.9 (L) 08/24/2022    MG 2.0 04/26/2022        No Images in the past 120 days found..             ASSESSMENT & PLAN  Diagnoses and all orders for this visit:    1. Primary hypertension (Primary)  Assessment & Plan:  Hypertension is is under good control with HCTZ 12.5 mg daily.  If systolic blood pressure less than 100 may decrease to 1 tablet every other day.  Monitor blood pressure check twice a day 3-4 times a week and record.  Patient is also on clonidine 0.1 mg 2 nightly which also may be keeping her blood pressure under better control.  Goal is to lose 5 to 10 pounds by next visit and may need to adjust BP medication then.  I discussed with patient management.    Orders:  -     hydroCHLOROthiazide (HYDRODIURIL) 12.5 MG tablet; Take 1 tablet by mouth Daily.  Dispense: 90 tablet; Refill: 1  -     CBC & Differential  -     Comprehensive Metabolic Panel    2. Pure hypercholesterolemia  Comments:  Uncontrolled with LDL equals 161, cholesterol equals 238.recheck lipids today-if LDL <70-will increase dose-no myalgias  Orders:  -     atorvastatin (LIPITOR) 20 MG tablet; Take 1 tablet by mouth Daily.  Dispense: 90 tablet; Refill: 1  -     Comprehensive Metabolic Panel  -     Lipid Panel    3. Prediabetes  Comments:  A1c=5.9 (8/2022)-cont with lifestyle changes--check A1cin 3-4 months-consider metformin if increasing  Orders:  -     Hemoglobin A1c    4. Menopause  Comments:  Fair  control  Increase clonidine 0.1 mg 2 tablets at night  Orders:  -     cloNIDine (Catapres) 0.1 MG tablet; 2 tabs po q hs  Dispense: 180 tablet; Refill: 1    5. Obesity (BMI 30-39.9)  Comments:  Discussed decreasing snacks/sodas/weight watchers/pherntermine/low carb diet-GERD BMI equals 25 around 140 pounds.  Continue  with daily exercise 45 to 60 minute    6. Vitamin B12 deficiency  -     Folate  -     Vitamin B12    7. Vitamin D deficiency  Comments:  Recheck levels today patient states she is taking 1000 units once a week.  Was finished with the high-dose prescribed last visit.  Orders:  -     Vitamin D 25 Hydroxy    Patient Instructions   Cont meds  Lose 5-10 lbs by next visit  Monitor blood pressure goal is 120/80 average.  If systolic blood pressure is less than 100 -do not take hydrochlorothiazide and may just need this medication every other day.  Do labs today and follow-up in 5 months with labs prior.         FOLLOW UP  Return in about 5 months (around 6/26/2023) for Recheck.    Patient was given instructions and counseling regarding her condition or for health maintenance advice. Please see specific information pulled into the AVS if appropriate.

## 2023-01-26 ENCOUNTER — OFFICE VISIT (OUTPATIENT)
Dept: INTERNAL MEDICINE | Facility: CLINIC | Age: 48
End: 2023-01-26
Payer: COMMERCIAL

## 2023-01-26 VITALS
HEART RATE: 74 BPM | HEIGHT: 64 IN | DIASTOLIC BLOOD PRESSURE: 76 MMHG | RESPIRATION RATE: 18 BRPM | WEIGHT: 198.8 LBS | BODY MASS INDEX: 33.94 KG/M2 | TEMPERATURE: 97.6 F | OXYGEN SATURATION: 97 % | SYSTOLIC BLOOD PRESSURE: 118 MMHG

## 2023-01-26 DIAGNOSIS — E66.9 OBESITY (BMI 30-39.9): ICD-10-CM

## 2023-01-26 DIAGNOSIS — E78.00 PURE HYPERCHOLESTEROLEMIA: ICD-10-CM

## 2023-01-26 DIAGNOSIS — R73.03 PREDIABETES: ICD-10-CM

## 2023-01-26 DIAGNOSIS — Z78.0 MENOPAUSE: ICD-10-CM

## 2023-01-26 DIAGNOSIS — E55.9 VITAMIN D DEFICIENCY: ICD-10-CM

## 2023-01-26 DIAGNOSIS — I10 PRIMARY HYPERTENSION: Primary | ICD-10-CM

## 2023-01-26 DIAGNOSIS — E53.8 VITAMIN B12 DEFICIENCY: ICD-10-CM

## 2023-01-26 LAB
25(OH)D3 SERPL-MCNC: 34.3 NG/ML (ref 30–100)
ALBUMIN SERPL-MCNC: 4.8 G/DL (ref 3.5–5.2)
ALBUMIN/GLOB SERPL: 1.8 G/DL
ALP SERPL-CCNC: 106 U/L (ref 39–117)
ALT SERPL W P-5'-P-CCNC: 37 U/L (ref 1–33)
ANION GAP SERPL CALCULATED.3IONS-SCNC: 10.8 MMOL/L (ref 5–15)
AST SERPL-CCNC: 26 U/L (ref 1–32)
BASOPHILS # BLD AUTO: 0.04 10*3/MM3 (ref 0–0.2)
BASOPHILS NFR BLD AUTO: 0.7 % (ref 0–1.5)
BILIRUB SERPL-MCNC: 0.4 MG/DL (ref 0–1.2)
BUN SERPL-MCNC: 14 MG/DL (ref 6–20)
BUN/CREAT SERPL: 18.7 (ref 7–25)
CALCIUM SPEC-SCNC: 10.4 MG/DL (ref 8.6–10.5)
CHLORIDE SERPL-SCNC: 99 MMOL/L (ref 98–107)
CHOLEST SERPL-MCNC: 162 MG/DL (ref 0–200)
CO2 SERPL-SCNC: 31.2 MMOL/L (ref 22–29)
CREAT SERPL-MCNC: 0.75 MG/DL (ref 0.57–1)
DEPRECATED RDW RBC AUTO: 39.9 FL (ref 37–54)
EGFRCR SERPLBLD CKD-EPI 2021: 97.7 ML/MIN/1.73
EOSINOPHIL # BLD AUTO: 0.09 10*3/MM3 (ref 0–0.4)
EOSINOPHIL NFR BLD AUTO: 1.6 % (ref 0.3–6.2)
ERYTHROCYTE [DISTWIDTH] IN BLOOD BY AUTOMATED COUNT: 12.3 % (ref 12.3–15.4)
FOLATE SERPL-MCNC: 11 NG/ML (ref 4.78–24.2)
GLOBULIN UR ELPH-MCNC: 2.7 GM/DL
GLUCOSE SERPL-MCNC: 109 MG/DL (ref 65–99)
HBA1C MFR BLD: 5.8 % (ref 4.8–5.6)
HCT VFR BLD AUTO: 40 % (ref 34–46.6)
HDLC SERPL-MCNC: 40 MG/DL (ref 40–60)
HGB BLD-MCNC: 13.3 G/DL (ref 12–15.9)
IMM GRANULOCYTES # BLD AUTO: 0.02 10*3/MM3 (ref 0–0.05)
IMM GRANULOCYTES NFR BLD AUTO: 0.4 % (ref 0–0.5)
LDLC SERPL CALC-MCNC: 86 MG/DL (ref 0–100)
LDLC/HDLC SERPL: 2 {RATIO}
LYMPHOCYTES # BLD AUTO: 1.61 10*3/MM3 (ref 0.7–3.1)
LYMPHOCYTES NFR BLD AUTO: 29.2 % (ref 19.6–45.3)
MCH RBC QN AUTO: 29.8 PG (ref 26.6–33)
MCHC RBC AUTO-ENTMCNC: 33.3 G/DL (ref 31.5–35.7)
MCV RBC AUTO: 89.5 FL (ref 79–97)
MONOCYTES # BLD AUTO: 0.48 10*3/MM3 (ref 0.1–0.9)
MONOCYTES NFR BLD AUTO: 8.7 % (ref 5–12)
NEUTROPHILS NFR BLD AUTO: 3.28 10*3/MM3 (ref 1.7–7)
NEUTROPHILS NFR BLD AUTO: 59.4 % (ref 42.7–76)
NRBC BLD AUTO-RTO: 0 /100 WBC (ref 0–0.2)
PLATELET # BLD AUTO: 332 10*3/MM3 (ref 140–450)
PMV BLD AUTO: 9.9 FL (ref 6–12)
POTASSIUM SERPL-SCNC: 4.3 MMOL/L (ref 3.5–5.2)
PROT SERPL-MCNC: 7.5 G/DL (ref 6–8.5)
RBC # BLD AUTO: 4.47 10*6/MM3 (ref 3.77–5.28)
SODIUM SERPL-SCNC: 141 MMOL/L (ref 136–145)
TRIGL SERPL-MCNC: 210 MG/DL (ref 0–150)
VIT B12 BLD-MCNC: 580 PG/ML (ref 211–946)
VLDLC SERPL-MCNC: 36 MG/DL (ref 5–40)
WBC NRBC COR # BLD: 5.52 10*3/MM3 (ref 3.4–10.8)

## 2023-01-26 PROCEDURE — 82306 VITAMIN D 25 HYDROXY: CPT | Performed by: INTERNAL MEDICINE

## 2023-01-26 PROCEDURE — 99214 OFFICE O/P EST MOD 30 MIN: CPT | Performed by: INTERNAL MEDICINE

## 2023-01-26 PROCEDURE — 82746 ASSAY OF FOLIC ACID SERUM: CPT | Performed by: INTERNAL MEDICINE

## 2023-01-26 PROCEDURE — 85025 COMPLETE CBC W/AUTO DIFF WBC: CPT | Performed by: INTERNAL MEDICINE

## 2023-01-26 PROCEDURE — 82607 VITAMIN B-12: CPT | Performed by: INTERNAL MEDICINE

## 2023-01-26 PROCEDURE — 80053 COMPREHEN METABOLIC PANEL: CPT | Performed by: INTERNAL MEDICINE

## 2023-01-26 PROCEDURE — 80061 LIPID PANEL: CPT | Performed by: INTERNAL MEDICINE

## 2023-01-26 PROCEDURE — 83036 HEMOGLOBIN GLYCOSYLATED A1C: CPT | Performed by: INTERNAL MEDICINE

## 2023-01-26 RX ORDER — ATORVASTATIN CALCIUM 20 MG/1
20 TABLET, FILM COATED ORAL DAILY
Qty: 90 TABLET | Refills: 1 | Status: SHIPPED | OUTPATIENT
Start: 2023-01-26

## 2023-01-26 RX ORDER — HYDROCHLOROTHIAZIDE 12.5 MG/1
12.5 TABLET ORAL DAILY
Qty: 90 TABLET | Refills: 1 | Status: SHIPPED | OUTPATIENT
Start: 2023-01-26

## 2023-01-26 RX ORDER — CLONIDINE HYDROCHLORIDE 0.1 MG/1
TABLET ORAL
Qty: 180 TABLET | Refills: 1 | Status: SHIPPED | OUTPATIENT
Start: 2023-01-26

## 2023-01-26 NOTE — PATIENT INSTRUCTIONS
Cont meds  Lose 5-10 lbs by next visit  Monitor blood pressure goal is 120/80 average.  If systolic blood pressure is less than 100 -do not take hydrochlorothiazide and may just need this medication every other day.  Do labs today and follow-up in 5 months with labs prior.

## 2023-01-26 NOTE — ASSESSMENT & PLAN NOTE
Hypertension is is under good control with HCTZ 12.5 mg daily.  If systolic blood pressure less than 100 may decrease to 1 tablet every other day.  Monitor blood pressure check twice a day 3-4 times a week and record.  Patient is also on clonidine 0.1 mg 2 nightly which also may be keeping her blood pressure under better control.  Goal is to lose 5 to 10 pounds by next visit and may need to adjust BP medication then.  I discussed with patient management.

## 2023-02-16 ENCOUNTER — TELEPHONE (OUTPATIENT)
Dept: INTERNAL MEDICINE | Facility: CLINIC | Age: 48
End: 2023-02-16

## 2023-02-16 NOTE — TELEPHONE ENCOUNTER
Caller: PANKAJ FROM [facility]    Relationship to patient: Other    Best call back number: [phone]    Patient is needing: CALLER IS  FROM [facility], CALLING TO PROVIDE INFORMATION IF HER ASSISTANCE IS NEEDED WITH PATIENT.

## 2023-08-07 ENCOUNTER — HOSPITAL ENCOUNTER (OUTPATIENT)
Dept: MAMMOGRAPHY | Facility: HOSPITAL | Age: 48
Discharge: HOME | End: 2023-08-07
Admitting: INTERNAL MEDICINE
Payer: COMMERCIAL

## 2023-08-07 DIAGNOSIS — Z12.31 ENCOUNTER FOR SCREENING MAMMOGRAM FOR MALIGNANT NEOPLASM OF BREAST: ICD-10-CM

## 2023-08-07 PROCEDURE — 77067 SCR MAMMO BI INCL CAD: CPT

## 2023-08-07 PROCEDURE — 77063 BREAST TOMOSYNTHESIS BI: CPT

## 2023-08-09 DIAGNOSIS — I10 PRIMARY HYPERTENSION: ICD-10-CM

## 2023-08-09 DIAGNOSIS — E78.00 PURE HYPERCHOLESTEROLEMIA: ICD-10-CM

## 2023-08-09 RX ORDER — ATORVASTATIN CALCIUM 20 MG/1
TABLET, FILM COATED ORAL
Qty: 90 TABLET | Refills: 1 | Status: SHIPPED | OUTPATIENT
Start: 2023-08-09

## 2023-08-09 RX ORDER — HYDROCHLOROTHIAZIDE 12.5 MG/1
TABLET ORAL
Qty: 90 TABLET | Refills: 1 | Status: SHIPPED | OUTPATIENT
Start: 2023-08-09

## 2023-09-27 DIAGNOSIS — Z78.0 MENOPAUSE: ICD-10-CM

## 2023-09-27 RX ORDER — CLONIDINE HYDROCHLORIDE 0.1 MG/1
TABLET ORAL
Qty: 180 TABLET | Refills: 1 | Status: SHIPPED | OUTPATIENT
Start: 2023-09-27

## 2023-11-16 ENCOUNTER — LAB (OUTPATIENT)
Dept: INTERNAL MEDICINE | Facility: CLINIC | Age: 48
End: 2023-11-16
Payer: COMMERCIAL

## 2023-11-16 DIAGNOSIS — Z12.31 ENCOUNTER FOR SCREENING MAMMOGRAM FOR MALIGNANT NEOPLASM OF BREAST: ICD-10-CM

## 2023-11-16 DIAGNOSIS — E78.49 OTHER HYPERLIPIDEMIA: ICD-10-CM

## 2023-11-16 DIAGNOSIS — R73.03 PREDIABETES: ICD-10-CM

## 2023-11-16 DIAGNOSIS — I10 PRIMARY HYPERTENSION: ICD-10-CM

## 2023-11-16 DIAGNOSIS — E55.9 VITAMIN D DEFICIENCY: ICD-10-CM

## 2023-11-16 LAB
25(OH)D3 SERPL-MCNC: 37 NG/ML (ref 30–100)
ANION GAP SERPL CALCULATED.3IONS-SCNC: 10.2 MMOL/L (ref 5–15)
BUN SERPL-MCNC: 18 MG/DL (ref 6–20)
BUN/CREAT SERPL: 23.4 (ref 7–25)
CALCIUM SPEC-SCNC: 9.7 MG/DL (ref 8.6–10.5)
CHLORIDE SERPL-SCNC: 102 MMOL/L (ref 98–107)
CO2 SERPL-SCNC: 27.8 MMOL/L (ref 22–29)
CREAT SERPL-MCNC: 0.77 MG/DL (ref 0.57–1)
EGFRCR SERPLBLD CKD-EPI 2021: 94.7 ML/MIN/1.73
FOLATE SERPL-MCNC: 5.3 NG/ML (ref 4.78–24.2)
GLUCOSE SERPL-MCNC: 111 MG/DL (ref 65–99)
HBA1C MFR BLD: 6 % (ref 4.8–5.6)
POTASSIUM SERPL-SCNC: 4.2 MMOL/L (ref 3.5–5.2)
SODIUM SERPL-SCNC: 140 MMOL/L (ref 136–145)
VIT B12 BLD-MCNC: 944 PG/ML (ref 211–946)

## 2023-11-16 PROCEDURE — 82306 VITAMIN D 25 HYDROXY: CPT | Performed by: INTERNAL MEDICINE

## 2023-11-16 PROCEDURE — 80048 BASIC METABOLIC PNL TOTAL CA: CPT | Performed by: INTERNAL MEDICINE

## 2023-11-16 PROCEDURE — 83036 HEMOGLOBIN GLYCOSYLATED A1C: CPT | Performed by: INTERNAL MEDICINE

## 2023-11-16 PROCEDURE — 82746 ASSAY OF FOLIC ACID SERUM: CPT | Performed by: INTERNAL MEDICINE

## 2023-11-16 PROCEDURE — 82607 VITAMIN B-12: CPT | Performed by: INTERNAL MEDICINE

## 2023-11-16 PROCEDURE — 36415 COLL VENOUS BLD VENIPUNCTURE: CPT | Performed by: INTERNAL MEDICINE

## 2023-11-19 PROBLEM — Z12.4 SCREENING FOR CERVICAL CANCER: Status: ACTIVE | Noted: 2023-11-19

## 2023-11-19 PROBLEM — Z12.11 SCREEN FOR COLON CANCER: Status: ACTIVE | Noted: 2023-11-19

## 2023-11-19 PROBLEM — Z12.31 ENCOUNTER FOR SCREENING MAMMOGRAM FOR MALIGNANT NEOPLASM OF BREAST: Status: ACTIVE | Noted: 2023-11-19

## 2023-11-20 ENCOUNTER — OFFICE VISIT (OUTPATIENT)
Dept: INTERNAL MEDICINE | Facility: CLINIC | Age: 48
End: 2023-11-20
Payer: COMMERCIAL

## 2023-11-20 VITALS
TEMPERATURE: 98 F | SYSTOLIC BLOOD PRESSURE: 138 MMHG | DIASTOLIC BLOOD PRESSURE: 92 MMHG | OXYGEN SATURATION: 99 % | HEART RATE: 78 BPM | HEIGHT: 64 IN | WEIGHT: 203.2 LBS | BODY MASS INDEX: 34.69 KG/M2

## 2023-11-20 DIAGNOSIS — Z12.11 SCREEN FOR COLON CANCER: ICD-10-CM

## 2023-11-20 DIAGNOSIS — Z12.31 ENCOUNTER FOR SCREENING MAMMOGRAM FOR MALIGNANT NEOPLASM OF BREAST: ICD-10-CM

## 2023-11-20 DIAGNOSIS — Z00.00 ENCOUNTER FOR ROUTINE HISTORY AND PHYSICAL EXAMINATION: Primary | ICD-10-CM

## 2023-11-20 DIAGNOSIS — I10 PRIMARY HYPERTENSION: ICD-10-CM

## 2023-11-20 DIAGNOSIS — Z12.4 SCREENING FOR CERVICAL CANCER: ICD-10-CM

## 2023-11-20 DIAGNOSIS — R73.03 PREDIABETES: ICD-10-CM

## 2023-11-20 DIAGNOSIS — E78.00 PURE HYPERCHOLESTEROLEMIA: ICD-10-CM

## 2023-11-20 PROBLEM — R73.9 HYPERGLYCEMIA: Status: RESOLVED | Noted: 2022-05-24 | Resolved: 2023-11-20

## 2023-11-20 RX ORDER — TRIAMTERENE AND HYDROCHLOROTHIAZIDE 37.5; 25 MG/1; MG/1
1 TABLET ORAL DAILY
Qty: 30 TABLET | Refills: 2 | Status: SHIPPED | OUTPATIENT
Start: 2023-11-20

## 2023-11-20 RX ORDER — METFORMIN HYDROCHLORIDE 500 MG/1
TABLET, EXTENDED RELEASE ORAL
Qty: 60 TABLET | Refills: 5 | Status: SHIPPED | OUTPATIENT
Start: 2023-11-20

## 2023-11-20 NOTE — PATIENT INSTRUCTIONS
Increase metformin  mg to 2 tablets daily goal is A1c of less than 6  Give COVID booster today  Phhysical form signed  F/u 3 months -with labs prior  Monitor BP -goal <130/80  If systolic  blood pressure (top number)  is less than 100 can decrease Maxide to 1/2 tablet daily

## 2023-11-20 NOTE — PROGRESS NOTES
CHIEF COMPLAINT  Janneth Adkins presents to [facility] for follow-up of Follow-up (4 month follow up ), Labs Only (Were done for this visit), Hyperlipidemia, and work physical paperwork to be filled out.    HPI    48 yo pt here for physical /and for work-and check up    preDM--inc A1c=6-not ffg DM diet-has backed off on carbs -no diarrhea  Still trying not to snack after dinner/exercising/walking in mornings x 30 min    HTN-stable with meds    Chol -stable with meds      Patient Instructions 1/26/23   Cont meds  Lose 5-10 lbs by next visit  Monitor blood pressure goal is 120/80 average.  If systolic blood pressure is less than 100 -do not take hydrochlorothiazide and may just need this medication every other day.  Do labs today and follow-up in 5 months with labs prior.        1/26/23 visit  here for routine check up.Labs not done     Records reviewed, labs reviewed, meds reviewed in detail.  Plan of care is as follows below.     Past medical history significant for hypertension-better with hctz, and hyperlipidemia-out of statin for one month-, vitamin D and vitamin B12 deficiency, chronic headache-but less with better BP control..  PreDM-+FH in dad, pat uncle, mat GM     SH-working in [employer]-from home -no kids-lives with -moved from [place]      Works out in evenings daily-45 min daily   Trying not to snack-       Current Outpatient Medications:     atorvastatin (LIPITOR) 20 MG tablet, TAKE 1 TABLET BY MOUTH EVERY DAY, Disp: 90 tablet, Rfl: 1    Cyanocobalamin (Vitamin B-12) 1000 MCG sublingual tablet, 0ne tab daily 5 days/week (Patient taking differently: 0ne tab daily 5 days/week OTC), Disp: 90 tablet, Rfl: 1    metFORMIN ER (GLUCOPHAGE-XR) 500 MG 24 hr tablet, two tabs po daily, Disp: 60 tablet, Rfl: 5    triamterene-hydrochlorothiazide (MAXZIDE-25) 37.5-25 MG per tablet, Take 1 tablet by mouth Daily., Disp: 30 tablet, Rfl: 2   PFSH  "reviewed.      OBJECTIVE  Vital Signs  Vitals:    11/20/23 0735 11/20/23 0802   BP: 132/84 138/92   BP Location: Left arm Left arm   Patient Position: Sitting Sitting   Cuff Size: Large Adult Adult   Pulse: 78    Temp: 98 °F (36.7 °C)    TempSrc: Temporal    SpO2: 99%    Weight: 92.2 kg (203 lb 3.2 oz)    Height: 162.6 cm (64\")       Body mass index is 34.88 kg/m². IBW=25    Physical Exam  Vitals and nursing note reviewed.   Constitutional:       Appearance: Normal appearance. She is obese.   HENT:      Head: Normocephalic and atraumatic.   Cardiovascular:      Rate and Rhythm: Normal rate and regular rhythm.   Pulmonary:      Effort: Pulmonary effort is normal.      Breath sounds: Normal breath sounds.   Abdominal:      Palpations: Abdomen is soft.   Musculoskeletal:      Cervical back: Normal range of motion and neck supple.   Neurological:      General: No focal deficit present.      Mental Status: She is alert and oriented to person, place, and time.          RESULTS REVIEW  No results found for: \"PROBNP\", \"BNP\"  CMP          1/26/2023    08:10 7/14/2023    09:02 11/16/2023    08:38   CMP   Glucose 109  115  111    BUN 14  11  18    Creatinine 0.75  0.70  0.77    EGFR 97.7  106.2  94.7    Sodium 141  140  140    Potassium 4.3  4.4  4.2    Chloride 99  103  102    Calcium 10.4  9.9  9.7    Total Protein 7.5  7.0     Albumin 4.8  4.5     Globulin 2.7  2.5     Total Bilirubin 0.4  0.4     Alkaline Phosphatase 106  102     AST (SGOT) 26  19     ALT (SGPT) 37  32     Albumin/Globulin Ratio 1.8  1.8     BUN/Creatinine Ratio 18.7  15.7  23.4    Anion Gap 10.8  10.5  10.2      CBC w/diff          1/26/2023    08:10 7/14/2023    09:02   CBC w/Diff   WBC 5.52  4.88    RBC 4.47  4.34    Hemoglobin 13.3  13.3    Hematocrit 40.0  37.6    MCV 89.5  86.6    MCH 29.8  30.6    MCHC 33.3  35.4    RDW 12.3  12.3    Platelets 332  306    Neutrophil Rel % 59.4  56.4    Immature Granulocyte Rel % 0.4  0.2    Lymphocyte Rel % 29.2  " 31.1    Monocyte Rel % 8.7  9.0    Eosinophil Rel % 1.6  2.3    Basophil Rel % 0.7  1.0       Lipid Panel          1/26/2023    08:10 7/14/2023    09:02   Lipid Panel   Total Cholesterol 162  166    Triglycerides 210  125    HDL Cholesterol 40  42    VLDL Cholesterol 36  22    LDL Cholesterol  86  102    LDL/HDL Ratio 2.00  2.36       Lab Results   Component Value Date    TSH 1.350 07/14/2023    TSH 1.660 04/26/2022      Lab Results   Component Value Date    FREET4 1.12 07/14/2023    FREET4 1.09 04/26/2022      A1C Last 3 Results          1/26/2023    08:10 7/14/2023    09:02 11/16/2023    08:38   HGBA1C Last 3 Results   Hemoglobin A1C 5.80  5.70  6.00       Lab Results   Component Value Date    RQSDCCEL68 944 11/16/2023    EYCV74PL 37.0 11/16/2023    MG 2.3 07/14/2023        Mammo Screening Digital Tomosynthesis Bilateral With CAD    Result Date: 8/7/2023   Benign mammogram. Suggest routine mammographic screening.  RECOMMENDATION(S):  ROUTINE MAMMOGRAM AND CLINICAL EVALUATION IN 12 MONTHS.   BIRADS:  DIAGNOSTIC CATEGORY 1--NEGATIVE.   BREAST COMPOSITION: Heterogeneously dense,which may obscure small masses.  PLEASE NOTE:  A NORMAL MAMMOGRAM DOES NOT EXCLUDE THE POSSIBILITY OF BREAST CANCER. ANY CLINICALLY SUSPICIOUS PALPABLE LUMP SHOULD BE BIOPSIED.      FABI HOPPER MD       Electronically Signed and Approved By: FABI HOPPER MD on 8/07/2023 at 15:36                        ASSESSMENT & PLAN  Diagnoses and all orders for this visit:    1. Encounter for routine history and physical examination (Primary)  -     triamterene-hydrochlorothiazide (MAXZIDE-25) 37.5-25 MG per tablet; Take 1 tablet by mouth Daily.  Dispense: 30 tablet; Refill: 2  -     Comprehensive Metabolic Panel; Future  -     Lipid Panel; Future  -     CBC & Differential; Future  -     Vitamin B12; Future  -     Folate; Future  -     Vitamin D,25-Hydroxy; Future  -     Magnesium; Future  -     Microalbumin / Creatinine Urine Ratio - Urine, Clean  Catch; Future  -     Hemoglobin A1c; Future  -     TSH+Free T4; Future  -     T3, Free; Future    2. Prediabetes  Comments:  A1c=5.7 (1/2023)-follow low carb diet  Assessment & Plan:  A1c increasing at 6.0.  Only on metformin XR 5 mg 1 daily-not ffg DM diet-has backed off on carbs -no diarrhea  Still trying not to snack after dinner/exercising/walking in mornings x 30 min    Plan-increase metformin XR to 5 mg 2 tablets daily continue to exercise cut back on carbs and doing a good job not snacking after dinner.  Check A1c in 3 months.    Orders:  -     metFORMIN ER (GLUCOPHAGE-XR) 500 MG 24 hr tablet; two tabs po daily  Dispense: 60 tablet; Refill: 5  -     triamterene-hydrochlorothiazide (MAXZIDE-25) 37.5-25 MG per tablet; Take 1 tablet by mouth Daily.  Dispense: 30 tablet; Refill: 2  -     Comprehensive Metabolic Panel; Future  -     Lipid Panel; Future  -     CBC & Differential; Future  -     Vitamin B12; Future  -     Folate; Future  -     Vitamin D,25-Hydroxy; Future  -     Magnesium; Future  -     Microalbumin / Creatinine Urine Ratio - Urine, Clean Catch; Future  -     Hemoglobin A1c; Future  -     TSH+Free T4; Future  -     T3, Free; Future    3. Primary hypertension  Assessment & Plan:  Hypertension is is not under good control with HCTZ 12.5 mg daily.   BP at home 135/80-90    PLAN-change to triamterene/hctz 37.5/25-continue to monitor blood pressure -check twice a day 3-4 times a week and record.  Follow a low-salt diet.  Discussed again that hypertension is a silent killer treatment involves dealing with stress low-salt diet exercise along with medications..  Goal blood pressure is less than 130/80.  If starting to fall less than 100 systolic patient needs to let me know and can also cut the Maxide to 1/2 tablet.    Orders:  -     triamterene-hydrochlorothiazide (MAXZIDE-25) 37.5-25 MG per tablet; Take 1 tablet by mouth Daily.  Dispense: 30 tablet; Refill: 2  -     Comprehensive Metabolic Panel; Future  -      Lipid Panel; Future  -     CBC & Differential; Future  -     Vitamin B12; Future  -     Folate; Future  -     Vitamin D,25-Hydroxy; Future  -     Magnesium; Future  -     Microalbumin / Creatinine Urine Ratio - Urine, Clean Catch; Future  -     Hemoglobin A1c; Future  -     TSH+Free T4; Future  -     T3, Free; Future    4. Pure hypercholesterolemia  Assessment & Plan:  Lipids stable tolerating atorvastatin 20 mg daily no myalgias    Plan-continue atorvastatin 20 mg 1 daily recheck lipids in 3 months-last LDL was in July 2023 and his was down to 102 cholesterol 166 and HDL 42.    Orders:  -     triamterene-hydrochlorothiazide (MAXZIDE-25) 37.5-25 MG per tablet; Take 1 tablet by mouth Daily.  Dispense: 30 tablet; Refill: 2  -     Comprehensive Metabolic Panel; Future  -     Lipid Panel; Future  -     CBC & Differential; Future  -     Vitamin B12; Future  -     Folate; Future  -     Vitamin D,25-Hydroxy; Future  -     Magnesium; Future  -     Microalbumin / Creatinine Urine Ratio - Urine, Clean Catch; Future  -     Hemoglobin A1c; Future  -     TSH+Free T4; Future  -     T3, Free; Future    5. Screening for cervical cancer  -     triamterene-hydrochlorothiazide (MAXZIDE-25) 37.5-25 MG per tablet; Take 1 tablet by mouth Daily.  Dispense: 30 tablet; Refill: 2  -     Comprehensive Metabolic Panel; Future  -     Lipid Panel; Future  -     CBC & Differential; Future  -     Vitamin B12; Future  -     Folate; Future  -     Vitamin D,25-Hydroxy; Future  -     Magnesium; Future  -     Microalbumin / Creatinine Urine Ratio - Urine, Clean Catch; Future  -     Hemoglobin A1c; Future  -     TSH+Free T4; Future  -     T3, Free; Future    6. Encounter for screening mammogram for malignant neoplasm of breast  -     Cancel: Mammo Screening Digital Tomosynthesis Bilateral With CAD; Future  -     triamterene-hydrochlorothiazide (MAXZIDE-25) 37.5-25 MG per tablet; Take 1 tablet by mouth Daily.  Dispense: 30 tablet; Refill: 2  -      Comprehensive Metabolic Panel; Future  -     Lipid Panel; Future  -     CBC & Differential; Future  -     Vitamin B12; Future  -     Folate; Future  -     Vitamin D,25-Hydroxy; Future  -     Magnesium; Future  -     Microalbumin / Creatinine Urine Ratio - Urine, Clean Catch; Future  -     Hemoglobin A1c; Future  -     TSH+Free T4; Future  -     T3, Free; Future    7. Screen for colon cancer  -     Cancel: Ambulatory Referral For Screening Colonoscopy  -     Ambulatory Referral For Screening Colonoscopy  -     triamterene-hydrochlorothiazide (MAXZIDE-25) 37.5-25 MG per tablet; Take 1 tablet by mouth Daily.  Dispense: 30 tablet; Refill: 2  -     Comprehensive Metabolic Panel; Future  -     Lipid Panel; Future  -     CBC & Differential; Future  -     Vitamin B12; Future  -     Folate; Future  -     Vitamin D,25-Hydroxy; Future  -     Magnesium; Future  -     Microalbumin / Creatinine Urine Ratio - Urine, Clean Catch; Future  -     Hemoglobin A1c; Future  -     TSH+Free T4; Future  -     T3, Free; Future         BMI is below normal parameters (malnutrition). Recommendations: referral to dietitian and goal is 140 lbs =IBW-BMI goal =25     Ages 40 to 64 Counseling/Anticipatory Guidance Discussed: nutrition, physical activity, healthy weight, injury prevention, misuse of tobacco, alcohol and drugs, sexual behavior and STDs, contraception, dental health, mental health, immunizations, screenings, self-breast exam, and wears seat belts  Patient Instructions   Increase metformin  mg to 2 tablets daily goal is A1c of less than 6  Give COVID booster today  Phhysical form signed  F/u 3 months -with labs prior  Monitor BP -goal <130/80  If systolic  blood pressure (top number)  is less than 100 can decrease Maxide to 1/2 tablet daily   Patient states she currently has [employer] insurance which is under negotiations with [employer] at present and waiting for decision to see if she can continue to come in 2024.    FOLLOW UP  Return  in about 3 months (around 2/20/2024) for Recheck.    Patient was given instructions and counseling regarding her condition or for health maintenance advice. Please see specific information pulled into the AVS if appropriate.

## 2023-11-20 NOTE — ASSESSMENT & PLAN NOTE
Lipids stable tolerating atorvastatin 20 mg daily no myalgias    Plan-continue atorvastatin 20 mg 1 daily recheck lipids in 3 months-last LDL was in July 2023 and his was down to 102 cholesterol 166 and HDL 42.

## 2023-11-20 NOTE — ASSESSMENT & PLAN NOTE
A1c increasing at 6.0.  Only on metformin XR 5 mg 1 daily-not ffg DM diet-has backed off on carbs -no diarrhea  Still trying not to snack after dinner/exercising/walking in mornings x 30 min    Plan-increase metformin XR to 5 mg 2 tablets daily continue to exercise cut back on carbs and doing a good job not snacking after dinner.  Check A1c in 3 months.

## 2023-11-20 NOTE — ASSESSMENT & PLAN NOTE
Hypertension is is not under good control with HCTZ 12.5 mg daily.   BP at home 135/80-90    PLAN-change to triamterene/hctz 37.5/25-continue to monitor blood pressure -check twice a day 3-4 times a week and record.  Follow a low-salt diet.  Discussed again that hypertension is a silent killer treatment involves dealing with stress low-salt diet exercise along with medications..  Goal blood pressure is less than 130/80.  If starting to fall less than 100 systolic patient needs to let me know and can also cut the Maxide to 1/2 tablet.

## 2024-02-05 DIAGNOSIS — E78.00 PURE HYPERCHOLESTEROLEMIA: ICD-10-CM

## 2024-02-05 RX ORDER — ATORVASTATIN CALCIUM 20 MG/1
TABLET, FILM COATED ORAL
Qty: 90 TABLET | Refills: 1 | Status: SHIPPED | OUTPATIENT
Start: 2024-02-05

## 2024-02-16 DIAGNOSIS — Z12.11 SCREEN FOR COLON CANCER: ICD-10-CM

## 2024-02-16 DIAGNOSIS — E78.00 PURE HYPERCHOLESTEROLEMIA: ICD-10-CM

## 2024-02-16 DIAGNOSIS — Z12.4 SCREENING FOR CERVICAL CANCER: ICD-10-CM

## 2024-02-16 DIAGNOSIS — R73.03 PREDIABETES: ICD-10-CM

## 2024-02-16 DIAGNOSIS — I10 PRIMARY HYPERTENSION: ICD-10-CM

## 2024-02-16 DIAGNOSIS — Z00.00 ENCOUNTER FOR ROUTINE HISTORY AND PHYSICAL EXAMINATION: ICD-10-CM

## 2024-02-16 DIAGNOSIS — Z12.31 ENCOUNTER FOR SCREENING MAMMOGRAM FOR MALIGNANT NEOPLASM OF BREAST: ICD-10-CM

## 2024-02-16 RX ORDER — TRIAMTERENE AND HYDROCHLOROTHIAZIDE 37.5; 25 MG/1; MG/1
1 TABLET ORAL DAILY
Qty: 90 TABLET | Refills: 1 | Status: SHIPPED | OUTPATIENT
Start: 2024-02-16

## 2024-03-20 ENCOUNTER — TELEPHONE (OUTPATIENT)
Dept: INTERNAL MEDICINE | Age: 49
End: 2024-03-20
Payer: COMMERCIAL

## 2024-03-20 DIAGNOSIS — R73.03 PREDIABETES: ICD-10-CM

## 2024-03-20 RX ORDER — METFORMIN HYDROCHLORIDE 500 MG/1
TABLET, EXTENDED RELEASE ORAL
Qty: 60 TABLET | Refills: 5 | Status: SHIPPED | OUTPATIENT
Start: 2024-03-20

## 2024-05-20 ENCOUNTER — CLINICAL SUPPORT (OUTPATIENT)
Dept: INTERNAL MEDICINE | Age: 49
End: 2024-05-20
Payer: COMMERCIAL

## 2024-05-20 ENCOUNTER — OFFICE VISIT (OUTPATIENT)
Dept: OBSTETRICS AND GYNECOLOGY | Facility: CLINIC | Age: 49
End: 2024-05-20
Payer: COMMERCIAL

## 2024-05-20 VITALS
HEART RATE: 79 BPM | BODY MASS INDEX: 35.17 KG/M2 | SYSTOLIC BLOOD PRESSURE: 140 MMHG | WEIGHT: 206 LBS | HEIGHT: 64 IN | DIASTOLIC BLOOD PRESSURE: 80 MMHG

## 2024-05-20 DIAGNOSIS — I10 PRIMARY HYPERTENSION: ICD-10-CM

## 2024-05-20 DIAGNOSIS — E78.00 PURE HYPERCHOLESTEROLEMIA: ICD-10-CM

## 2024-05-20 DIAGNOSIS — Z00.00 ENCOUNTER FOR ROUTINE HISTORY AND PHYSICAL EXAMINATION: ICD-10-CM

## 2024-05-20 DIAGNOSIS — Z01.419 ENCOUNTER FOR GYNECOLOGICAL EXAMINATION WITHOUT ABNORMAL FINDING: Primary | ICD-10-CM

## 2024-05-20 DIAGNOSIS — Z12.31 ENCOUNTER FOR SCREENING MAMMOGRAM FOR MALIGNANT NEOPLASM OF BREAST: ICD-10-CM

## 2024-05-20 DIAGNOSIS — Z12.11 SCREEN FOR COLON CANCER: ICD-10-CM

## 2024-05-20 DIAGNOSIS — R73.03 PREDIABETES: ICD-10-CM

## 2024-05-20 DIAGNOSIS — Z12.4 SCREENING FOR CERVICAL CANCER: ICD-10-CM

## 2024-05-20 LAB
25(OH)D3 SERPL-MCNC: 30.3 NG/ML (ref 30–100)
ALBUMIN SERPL-MCNC: 4.9 G/DL (ref 3.5–5.2)
ALBUMIN UR-MCNC: <1.2 MG/DL
ALBUMIN/GLOB SERPL: 1.9 G/DL
ALP SERPL-CCNC: 117 U/L (ref 39–117)
ALT SERPL W P-5'-P-CCNC: 40 U/L (ref 1–33)
ANION GAP SERPL CALCULATED.3IONS-SCNC: 13 MMOL/L (ref 5–15)
AST SERPL-CCNC: 18 U/L (ref 1–32)
BILIRUB SERPL-MCNC: 0.3 MG/DL (ref 0–1.2)
BUN SERPL-MCNC: 12 MG/DL (ref 6–20)
BUN/CREAT SERPL: 16.2 (ref 7–25)
CALCIUM SPEC-SCNC: 9.8 MG/DL (ref 8.6–10.5)
CHLORIDE SERPL-SCNC: 104 MMOL/L (ref 98–107)
CHOLEST SERPL-MCNC: 165 MG/DL (ref 0–200)
CO2 SERPL-SCNC: 27 MMOL/L (ref 22–29)
CREAT SERPL-MCNC: 0.74 MG/DL (ref 0.57–1)
CREAT UR-MCNC: 17.7 MG/DL
EGFRCR SERPLBLD CKD-EPI 2021: 98.7 ML/MIN/1.73
FOLATE SERPL-MCNC: 5.67 NG/ML (ref 4.78–24.2)
GLOBULIN UR ELPH-MCNC: 2.6 GM/DL
GLUCOSE SERPL-MCNC: 110 MG/DL (ref 65–99)
HBA1C MFR BLD: 5.8 % (ref 4.8–5.6)
HDLC SERPL-MCNC: 47 MG/DL (ref 40–60)
LDLC SERPL CALC-MCNC: 98 MG/DL (ref 0–100)
LDLC/HDLC SERPL: 2.05 {RATIO}
MAGNESIUM SERPL-MCNC: 2.1 MG/DL (ref 1.6–2.6)
MICROALBUMIN/CREAT UR: NORMAL MG/G{CREAT}
POTASSIUM SERPL-SCNC: 4.1 MMOL/L (ref 3.5–5.2)
PROT SERPL-MCNC: 7.5 G/DL (ref 6–8.5)
SODIUM SERPL-SCNC: 144 MMOL/L (ref 136–145)
T3FREE SERPL-MCNC: 3.46 PG/ML (ref 2–4.4)
T4 FREE SERPL-MCNC: 1.2 NG/DL (ref 0.93–1.7)
TRIGL SERPL-MCNC: 109 MG/DL (ref 0–150)
TSH SERPL DL<=0.05 MIU/L-ACNC: 1.39 UIU/ML (ref 0.27–4.2)
VIT B12 BLD-MCNC: 754 PG/ML (ref 211–946)
VLDLC SERPL-MCNC: 20 MG/DL (ref 5–40)

## 2024-05-20 PROCEDURE — 87624 HPV HI-RISK TYP POOLED RSLT: CPT | Performed by: OBSTETRICS & GYNECOLOGY

## 2024-05-20 PROCEDURE — 99386 PREV VISIT NEW AGE 40-64: CPT | Performed by: OBSTETRICS & GYNECOLOGY

## 2024-05-20 PROCEDURE — 80061 LIPID PANEL: CPT | Performed by: INTERNAL MEDICINE

## 2024-05-20 PROCEDURE — 84439 ASSAY OF FREE THYROXINE: CPT | Performed by: INTERNAL MEDICINE

## 2024-05-20 PROCEDURE — 82043 UR ALBUMIN QUANTITATIVE: CPT | Performed by: INTERNAL MEDICINE

## 2024-05-20 PROCEDURE — 82746 ASSAY OF FOLIC ACID SERUM: CPT | Performed by: INTERNAL MEDICINE

## 2024-05-20 PROCEDURE — 82570 ASSAY OF URINE CREATININE: CPT | Performed by: INTERNAL MEDICINE

## 2024-05-20 PROCEDURE — 84481 FREE ASSAY (FT-3): CPT | Performed by: INTERNAL MEDICINE

## 2024-05-20 PROCEDURE — G0123 SCREEN CERV/VAG THIN LAYER: HCPCS | Performed by: OBSTETRICS & GYNECOLOGY

## 2024-05-20 PROCEDURE — 83036 HEMOGLOBIN GLYCOSYLATED A1C: CPT | Performed by: INTERNAL MEDICINE

## 2024-05-20 PROCEDURE — 84443 ASSAY THYROID STIM HORMONE: CPT | Performed by: INTERNAL MEDICINE

## 2024-05-20 PROCEDURE — 83735 ASSAY OF MAGNESIUM: CPT | Performed by: INTERNAL MEDICINE

## 2024-05-20 PROCEDURE — 36415 COLL VENOUS BLD VENIPUNCTURE: CPT | Performed by: INTERNAL MEDICINE

## 2024-05-20 PROCEDURE — 82607 VITAMIN B-12: CPT | Performed by: INTERNAL MEDICINE

## 2024-05-20 PROCEDURE — 80053 COMPREHEN METABOLIC PANEL: CPT | Performed by: INTERNAL MEDICINE

## 2024-05-20 PROCEDURE — 82306 VITAMIN D 25 HYDROXY: CPT | Performed by: INTERNAL MEDICINE

## 2024-05-20 RX ORDER — TRIAMTERENE AND HYDROCHLOROTHIAZIDE 37.5; 25 MG/1; MG/1
1 TABLET ORAL DAILY
Qty: 90 TABLET | Refills: 1 | Status: SHIPPED | OUTPATIENT
Start: 2024-05-20

## 2024-05-20 NOTE — PROGRESS NOTES
"Well Woman Visit    CC: Annual well woman exam       HPI:   48 y.o. Contraception or HRT: Post menopausal  Menses:  none x 2 years  Pain:   occ pelvic pain  Incontinence concerns: No  Hx of abnormal pap:  No  Pt has no complaints today.      History: PMHx, Meds, Allergies, PSHx, Social Hx, and POBHx all reviewed and updated.      PHYSICAL EXAM:  /80   Pulse 79   Ht 162.6 cm (64\")   Wt 93.4 kg (206 lb)   BMI 35.36 kg/m²   General- NAD, alert and oriented, appropriate  Psych- Normal mood, good memory  Neck- No masses, no thyroid enlargement  CV- Regular rhythm, no murnurs  Resp- CTA to bases, no wheezes  Abdomen- Soft, non distended, non tender, no masses    Breast left-  Bilaterally symmetrical, no masses, non tender, no nipple discharge  Breast right- Bilaterally symmetrical, no masses, non tender, no nipple discharge    External genitalia- Normal female, no lesions  Urethra/meatus- Normal, no masses, non tender, no prolapse  Bladder- Normal, no masses, non tender, no prolapse  Vagina- Normal, no atrophy, no lesions, no discharge, no prolapse  Cvx- Normal, no lesions, no discharge, No cervical motion tenderness  Uterus- Normal size, shape & consistency.  Non tender, mobile.  Adnexa- No mass, non tender, Difficult to palpate  Anus/Rectum/Perineum- Declined    Lymphatic- No palpable neck, axillary, or groin nodes  Ext- No edema, no cyanosis    Skin- No lesions, no rashes, no acanthosis nigricans        ASSESSMENT and PLAN:  WWE    Diagnoses and all orders for this visit:    1. Encounter for gynecological examination without abnormal finding (Primary)  -     Mammo Screening Digital Tomosynthesis Bilateral With CAD; Future  -     IgP, Aptima HPV    Discussed elevated blood pressure. Pt states they just started her on a new bp med and are following it.    Domestic violence/abuse screen: negative    Depression screen: no SI    Preventative:   BREAST HEALTH- Monthly self breast exam importance and how to " reviewed. MMG and/or MRI (prn) reviewed per society guidelines and her individual history. Mammo/MRI screen: Updated today.  CERVICAL CANCER Screening- Reviewed current ASCCP guidelines for screening w and wo cotest HPV, age specific.  Screen: Updated today.  COLON CANCER Screening- Reviewed current medical society guidelines and options.  Colonoscopy screen:  scheduled.  SEXUAL HEALTH: Declines STD screening.  VACCINATIONS Recommended: Flu annually, Zoster (49yo and older).  Importance discussed, risk being unvaccinated reviewed.  Questions answered  Smoking status- NON SMOKER.  Importance of avoiding second hand smoke.  Follow up PCP/Specialist PMHx and Labs  [facility] : does not qualify      She understands the importance of having any ordered tests to be performed in a timely fashion.  She is encouraged to review her results online and/or contact or office if she has questions.     Follow Up:  Return in about 1 year (around 5/20/2025) for Annual physical.      Karin Horne, APRN  05/20/2024

## 2024-05-21 ENCOUNTER — PATIENT ROUNDING (BHMG ONLY) (OUTPATIENT)
Dept: OBSTETRICS AND GYNECOLOGY | Facility: CLINIC | Age: 49
End: 2024-05-21
Payer: COMMERCIAL

## 2024-05-21 NOTE — PROGRESS NOTES
A My-Chart message has been sent to the patient for PATIENT ROUNDING with [facility].

## 2024-05-23 LAB
CYTOLOGIST CVX/VAG CYTO: NORMAL
CYTOLOGY CVX/VAG DOC CYTO: NORMAL
CYTOLOGY CVX/VAG DOC THIN PREP: NORMAL
DX ICD CODE: NORMAL
HPV I/H RISK 4 DNA CVX QL PROBE+SIG AMP: NEGATIVE
Lab: NORMAL
OTHER STN SPEC: NORMAL
STAT OF ADQ CVX/VAG CYTO-IMP: NORMAL

## 2024-05-24 ENCOUNTER — OFFICE VISIT (OUTPATIENT)
Dept: INTERNAL MEDICINE | Age: 49
End: 2024-05-24
Payer: COMMERCIAL

## 2024-05-24 VITALS
HEIGHT: 64 IN | TEMPERATURE: 98.2 F | BODY MASS INDEX: 34.79 KG/M2 | OXYGEN SATURATION: 98 % | WEIGHT: 203.8 LBS | SYSTOLIC BLOOD PRESSURE: 112 MMHG | HEART RATE: 64 BPM | DIASTOLIC BLOOD PRESSURE: 80 MMHG

## 2024-05-24 DIAGNOSIS — I10 PRIMARY HYPERTENSION: ICD-10-CM

## 2024-05-24 DIAGNOSIS — R73.03 PREDIABETES: Primary | ICD-10-CM

## 2024-05-24 DIAGNOSIS — E78.00 PURE HYPERCHOLESTEROLEMIA: ICD-10-CM

## 2024-05-24 DIAGNOSIS — E55.9 VITAMIN D DEFICIENCY: ICD-10-CM

## 2024-05-24 PROCEDURE — 99214 OFFICE O/P EST MOD 30 MIN: CPT | Performed by: INTERNAL MEDICINE

## 2024-05-24 NOTE — ASSESSMENT & PLAN NOTE
Hypertension is is under good control with triamterene/HCTZ 37.5-12.5 mg daily.   BP atclinic =112/80    PLAN-cont with triamterene/hctz 37.5/25-continue to monitor blood pressure -check twice a day 3-4 times a week and record.  Follow a low-salt diet.    Discussed again that hypertension is a silent killer treatment involves dealing with stress low-salt diet exercise along with medications.  Goal blood pressure is less than 130/80.

## 2024-05-24 NOTE — PROGRESS NOTES
CHIEF COMPLAINT  Janneth Adkins presents to [facility] for follow-up of Hypertension (Pt is a 48 yr old female presenting to Internal Medicine for a follow up; Pt reports to check BP at home 2x daily with average readings of 118/80), Hyperlipidemia, and Prediabetes.    HPI  Last annual physical 11/2023  47 yo pt here for f/u DM, HTN and labs    PreDM-A1c=5.8-stopped metformin due to increased thirst    LDL 98, HDL 47, TChol 165    Htn-fair control with meds-BP =112/80    Older notes  Patient Instructions 11/20/23   Increase metformin  mg to 2 tablets daily goal is A1c of less than 6  Give COVID booster today  Phhysical form signed  F/u 3 months -with labs prior  Monitor BP -goal <130/80  If systolic  blood pressure (top number)  is less than 100 can decrease Maxide to 1/2 tablet daily   Patient states she currently has [employer] insurance which is under negotiations with [facility] at present and waiting for decision to see if she can continue to come in 2024.       11/20/23 visit   pt here for physical /and for work-and check up     preDM--inc A1c=6-not ffg DM diet-has backed off on carbs -no diarrhea  Still trying not to snack after dinner/exercising/walking in mornings x 30 min     HTN-stable with meds     Chol -stable with meds        Patient Instructions 1/26/23   Cont meds  Lose 5-10 lbs by next visit  Monitor blood pressure goal is 120/80 average.  If systolic blood pressure is less than 100 -do not take hydrochlorothiazide and may just need this medication every other day.  Do labs today and follow-up in 5 months with labs prior.        1/26/23 visit  here for routine check up.Labs not done     Records reviewed, labs reviewed, meds reviewed in detail.  Plan of care is as follows below.     Past medical history significant for hypertension-better with hctz, and hyperlipidemia-out of statin for one month-, vitamin D and vitamin B12 deficiency, chronic headache-but less  "with better BP control..  PreDM-+FH in dad, pat uncle, leanne GM     SH-working in [employer]-from home -no kids-lives with -moved from [place]      Works out in evenings daily-45 min daily   Trying not to snack-       Current Outpatient Medications:     atorvastatin (LIPITOR) 20 MG tablet, TAKE 1 TABLET BY MOUTH EVERY DAY, Disp: 90 tablet, Rfl: 1    Cyanocobalamin (Vitamin B-12) 1000 MCG sublingual tablet, 0ne tab daily 5 days/week (Patient taking differently: 0ne tab daily 5 days/week OTC), Disp: 90 tablet, Rfl: 1    triamterene-hydrochlorothiazide (MAXZIDE-25) 37.5-25 MG per tablet, TAKE 1 TABLET BY MOUTH EVERY DAY, Disp: 90 tablet, Rfl: 1   PFSH reviewed.      OBJECTIVE  Vital Signs  Vitals:    05/24/24 1057 05/24/24 1116   BP: 134/88 112/80   BP Location: Left arm Left arm   Patient Position: Sitting Sitting   Cuff Size: Adult Large Adult   Pulse: 64    Temp: 98.2 °F (36.8 °C)    TempSrc: Infrared    SpO2: 98%    Weight: 92.4 kg (203 lb 12.8 oz)    Height: 162.6 cm (64.02\")       Body mass index is 34.96 kg/m².    Physical Exam  Vitals and nursing note reviewed.   Constitutional:       Appearance: Normal appearance.   HENT:      Head: Normocephalic and atraumatic.   Cardiovascular:      Rate and Rhythm: Normal rate and regular rhythm.   Pulmonary:      Effort: Pulmonary effort is normal.      Breath sounds: Normal breath sounds.   Abdominal:      Palpations: Abdomen is soft.   Musculoskeletal:      Cervical back: Normal range of motion and neck supple.   Neurological:      General: No focal deficit present.      Mental Status: She is alert and oriented to person, place, and time.          RESULTS REVIEW  No results found for: \"PROBNP\", \"BNP\"  CMP          7/14/2023    09:02 11/16/2023    08:38 5/20/2024    09:40   CMP   Glucose 115  111  110    BUN 11  18  12    Creatinine 0.70  0.77  0.74    EGFR 106.2  94.7  98.7    Sodium 140  140  144    Potassium 4.4  4.2  4.1    Chloride 103  102  104  "   Calcium 9.9  9.7  9.8    Total Protein 7.0   7.5    Albumin 4.5   4.9    Globulin 2.5   2.6    Total Bilirubin 0.4   0.3    Alkaline Phosphatase 102   117    AST (SGOT) 19   18    ALT (SGPT) 32   40    Albumin/Globulin Ratio 1.8   1.9    BUN/Creatinine Ratio 15.7  23.4  16.2    Anion Gap 10.5  10.2  13.0      CBC w/diff          7/14/2023    09:02   CBC w/Diff   WBC 4.88    RBC 4.34    Hemoglobin 13.3    Hematocrit 37.6    MCV 86.6    MCH 30.6    MCHC 35.4    RDW 12.3    Platelets 306    Neutrophil Rel % 56.4    Immature Granulocyte Rel % 0.2    Lymphocyte Rel % 31.1    Monocyte Rel % 9.0    Eosinophil Rel % 2.3    Basophil Rel % 1.0       Lipid Panel          7/14/2023    09:02 5/20/2024    09:40   Lipid Panel   Total Cholesterol 166  165    Triglycerides 125  109    HDL Cholesterol 42  47    VLDL Cholesterol 22  20    LDL Cholesterol  102  98    LDL/HDL Ratio 2.36  2.05       Lab Results   Component Value Date    TSH 1.390 05/20/2024    TSH 1.350 07/14/2023    TSH 1.660 04/26/2022      Lab Results   Component Value Date    FREET4 1.20 05/20/2024    FREET4 1.12 07/14/2023    FREET4 1.09 04/26/2022      A1C Last 3 Results          7/14/2023    09:02 11/16/2023    08:38 5/20/2024    09:40   HGBA1C Last 3 Results   Hemoglobin A1C 5.70  6.00  5.80       Lab Results   Component Value Date    RTSVJUSI53 754 05/20/2024    XJQG38LV 30.3 05/20/2024    MG 2.1 05/20/2024        No Images in the past 120 days found..             ASSESSMENT & PLAN  Diagnoses and all orders for this visit:    1. Prediabetes (Primary)  Assessment & Plan:  A1c=5.80.  Stopped  metformin  mg due to increased thirst   Still trying not to snack after dinner/exercising/walking in mornings x 30 min    Plan- continue to exercise   cut back on carbs and not snacking after dinner.  Check A1c in 3 months.    Orders:  -     Comprehensive Metabolic Panel; Future  -     CBC & Differential; Future  -     Vitamin D,25-Hydroxy; Future  -     Magnesium;  Future  -     Microalbumin / Creatinine Urine Ratio - Urine, Clean Catch; Future  -     Hemoglobin A1c; Future    2. Pure hypercholesterolemia  Assessment & Plan:  Lipids stable tolerating atorvastatin 20 mg daily no myalgias    Plan-continue atorvastatin 20 mg 1 daily recheck lipids in 3 months-last LDL was in July 2023 and his was down to 102 cholesterol 166 and HDL 42.    Orders:  -     Comprehensive Metabolic Panel; Future  -     CBC & Differential; Future  -     Vitamin D,25-Hydroxy; Future  -     Magnesium; Future  -     Microalbumin / Creatinine Urine Ratio - Urine, Clean Catch; Future  -     Hemoglobin A1c; Future    3. Primary hypertension  Assessment & Plan:  Hypertension is is under good control with triamterene/HCTZ 37.5-12.5 mg daily.   BP atclinic =112/80    PLAN-cont with triamterene/hctz 37.5/25-continue to monitor blood pressure -check twice a day 3-4 times a week and record.  Follow a low-salt diet.    Discussed again that hypertension is a silent killer treatment involves dealing with stress low-salt diet exercise along with medications.  Goal blood pressure is less than 130/80.     Orders:  -     Comprehensive Metabolic Panel; Future  -     CBC & Differential; Future  -     Vitamin D,25-Hydroxy; Future  -     Magnesium; Future  -     Microalbumin / Creatinine Urine Ratio - Urine, Clean Catch; Future  -     Hemoglobin A1c; Future    4. Vitamin D deficiency  -     Vitamin D,25-Hydroxy; Future                 Patient Instructions   Follow low carb diet  Cut back on snacks  Lose 5 lbs by next visit  F/u 4 months with labs prior     FOLLOW UP  Return in about 4 months (around 9/24/2024) for Recheck. Labs prior    Patient was given instructions and counseling regarding her condition or for health maintenance advice. Please see specific information pulled into the AVS if appropriate.

## 2024-05-24 NOTE — ASSESSMENT & PLAN NOTE
A1c=5.80.  Stopped  metformin  mg due to increased thirst   Still trying not to snack after dinner/exercising/walking in mornings x 30 min    Plan- continue to exercise   cut back on carbs and not snacking after dinner.  Check A1c in 3 months.

## 2024-06-02 ENCOUNTER — HOSPITAL ENCOUNTER (EMERGENCY)
Facility: HOSPITAL | Age: 49
Discharge: HOME | End: 2024-06-02
Attending: EMERGENCY MEDICINE | Admitting: EMERGENCY MEDICINE
Payer: COMMERCIAL

## 2024-06-02 ENCOUNTER — APPOINTMENT (OUTPATIENT)
Dept: GENERAL RADIOLOGY | Facility: HOSPITAL | Age: 49
End: 2024-06-02
Payer: COMMERCIAL

## 2024-06-02 VITALS
WEIGHT: 203.48 LBS | DIASTOLIC BLOOD PRESSURE: 97 MMHG | HEIGHT: 64 IN | TEMPERATURE: 97.6 F | BODY MASS INDEX: 34.74 KG/M2 | HEART RATE: 96 BPM | OXYGEN SATURATION: 95 % | RESPIRATION RATE: 18 BRPM | SYSTOLIC BLOOD PRESSURE: 155 MMHG

## 2024-06-02 DIAGNOSIS — S61.216A LACERATION OF RIGHT LITTLE FINGER WITHOUT FOREIGN BODY WITHOUT DAMAGE TO NAIL, INITIAL ENCOUNTER: Primary | ICD-10-CM

## 2024-06-02 PROCEDURE — 99283 EMERGENCY DEPT VISIT LOW MDM: CPT

## 2024-06-02 PROCEDURE — 73140 X-RAY EXAM OF FINGER(S): CPT

## 2024-06-02 PROCEDURE — 25010000002 LIDOCAINE 1 % SOLUTION

## 2024-06-02 RX ORDER — GINSENG 100 MG
1 CAPSULE ORAL 2 TIMES DAILY
Qty: 9 G | Refills: 0 | Status: SHIPPED | OUTPATIENT
Start: 2024-06-02 | End: 2024-06-07

## 2024-06-02 RX ORDER — GINSENG 100 MG
1 CAPSULE ORAL ONCE
Status: COMPLETED | OUTPATIENT
Start: 2024-06-02 | End: 2024-06-02

## 2024-06-02 RX ORDER — LIDOCAINE HYDROCHLORIDE 10 MG/ML
INJECTION, SOLUTION INFILTRATION; PERINEURAL
Status: COMPLETED
Start: 2024-06-02 | End: 2024-06-02

## 2024-06-02 RX ORDER — LIDOCAINE HYDROCHLORIDE 10 MG/ML
10 INJECTION, SOLUTION INFILTRATION; PERINEURAL ONCE
Status: COMPLETED | OUTPATIENT
Start: 2024-06-02 | End: 2024-06-02

## 2024-06-02 RX ADMIN — LIDOCAINE HYDROCHLORIDE 10 ML: 10 INJECTION, SOLUTION INFILTRATION; PERINEURAL at 12:33

## 2024-06-02 RX ADMIN — BACITRACIN 0.9 G: 500 OINTMENT TOPICAL at 13:03

## 2024-06-02 NOTE — DISCHARGE INSTRUCTIONS
Keep the dressing on until tomorrow. Keep your wound clean and dry. Use soap and water. Apply bacitracin up to twice a day. Do not use hydrogen peroxide and/or neosporin. Take tylenol or advil for pain.    As discussed, I personally reviewed your x-ray and did not appreciate any fractures or foreign body. We will call you if anything changes once we get the official read from the Radiologist.    Follow up with your PCP in 5-7 days for wound check and probably suture removal.    Return to the Emergency Department if you develop any uncontrollable fever, intractable pain, nausea, vomiting.

## 2024-06-02 NOTE — ED PROVIDER NOTES
Time: 12:32 PM EDT  Date of encounter:  6/2/2024  Independent Historian/Clinical History and Information was obtained by:   Patient    History is limited by: N/A    Chief Complaint: Laceration to the right pinky      History of Present Illness:  Patient is a 48 y.o. year old female who presents to the emergency department for evaluation of laceration to the right pinky.  Patient states that she was washing dishes when she accidentally cut her pinky with a glass. Bleeding is controlled. Pt states that she is UTD on her tetanus.    HPI    Patient Care Team  Primary Care Provider: Esha Mosley MD    Past Medical History:     No Known Allergies  Past Medical History:   Diagnosis Date    Allergic 1989    Seasonal    Headache     HL (hearing loss)     Ringing in ears    Hyperlipidemia     Hypertension      History reviewed. No pertinent surgical history.  Family History   Problem Relation Age of Onset    Cancer Father         Skin    Diabetes Father     Hyperlipidemia Father     Liver disease Father     Hyperlipidemia Mother     Stroke Mother     Anxiety disorder Son     Cancer Paternal Grandfather         Stomach    Heart disease Paternal Grandmother     Heart disease Maternal Grandmother     Breast cancer Maternal Aunt     Cancer Maternal Aunt         Breast    Diabetes Maternal Uncle     Arthritis Paternal Aunt     Breast cancer Paternal Aunt     Cancer Paternal Aunt         Breast    Diabetes Paternal Uncle     Ovarian cancer Other     Uterine cancer Neg Hx     Colon cancer Neg Hx     Melanoma Neg Hx     Prostate cancer Neg Hx     Deep vein thrombosis Neg Hx        Home Medications:  Prior to Admission medications    Medication Sig Start Date End Date Taking? Authorizing Provider   atorvastatin (LIPITOR) 20 MG tablet TAKE 1 TABLET BY MOUTH EVERY DAY 2/5/24   Esha Mosley MD   Cyanocobalamin (Vitamin B-12) 1000 MCG sublingual tablet 0ne tab daily 5 days/week  Patient taking  "differently: 0ne tab daily 5 days/week  OTC 4/27/22   Esha Mosley MD   triamterene-hydrochlorothiazide (MAXZIDE-25) 37.5-25 MG per tablet TAKE 1 TABLET BY MOUTH EVERY DAY 5/20/24   Esha Mosley MD        Social History:   Social History     Tobacco Use    Smoking status: Never    Smokeless tobacco: Never   Vaping Use    Vaping status: Never Used   Substance Use Topics    Alcohol use: Not Currently     Comment: Socially    Drug use: Never         Review of Systems:  Review of Systems   Constitutional:  Negative for chills and fever.   HENT:  Negative for ear pain.    Eyes:  Negative for pain.   Respiratory:  Negative for cough and shortness of breath.    Cardiovascular:  Negative for chest pain.   Gastrointestinal:  Negative for abdominal pain, diarrhea, nausea and vomiting.   Genitourinary:  Negative for dysuria.   Musculoskeletal:  Negative for arthralgias.   Skin:  Positive for wound. Negative for rash.   Neurological:  Negative for headaches.        Physical Exam:  /97 (BP Location: Left arm, Patient Position: Sitting)   Pulse 96   Temp 97.6 °F (36.4 °C) (Oral)   Resp 18   Ht 162.6 cm (64\")   Wt 92.3 kg (203 lb 7.8 oz)   SpO2 95%   BMI 34.93 kg/m²     Physical Exam  HENT:      Head: Normocephalic.      Mouth/Throat:      Mouth: Mucous membranes are moist.   Eyes:      Pupils: Pupils are equal, round, and reactive to light.   Pulmonary:      Effort: Pulmonary effort is normal.   Abdominal:      General: There is no distension.   Musculoskeletal:      Cervical back: Neck supple.      Comments: R pinky: 4cm laceration to the dorsal aspect of the pink. Extensor and flexor tendons intact. Neurovasc intact. Cap refill normal.    Skin:     General: Skin is warm and dry.   Neurological:      General: No focal deficit present.      Mental Status: She is alert and oriented to person, place, and time.   Psychiatric:         Mood and Affect: Mood normal.         Behavior: " Behavior normal.                  Procedures:  Laceration Repair    Date/Time: 6/2/2024 12:57 PM    Performed by: Rod Quesada PA  Authorized by: Jd Mckenzie DO    Consent:     Consent obtained:  Verbal    Consent given by:  Patient    Risks discussed:  Infection, pain and need for additional repair    Alternatives discussed:  No treatment  Universal protocol:     Procedure explained and questions answered to patient or proxy's satisfaction: yes      Patient identity confirmed:  Verbally with patient  Anesthesia:     Anesthesia method:  Local infiltration    Local anesthetic:  Lidocaine 1% w/o epi  Laceration details:     Location:  Finger    Finger location:  R small finger    Length (cm):  4  Pre-procedure details:     Preparation:  Patient was prepped and draped in usual sterile fashion  Exploration:     Imaging obtained: x-ray      Imaging outcome: foreign body not noted      Contaminated: no    Treatment:     Area cleansed with:  Povidone-iodine and saline    Amount of cleaning:  Standard    Irrigation solution:  Sterile saline    Irrigation method:  Syringe and tap  Skin repair:     Repair method:  Sutures    Suture size:  4-0    Suture material:  Nylon    Suture technique:  Simple interrupted    Number of sutures:  5  Approximation:     Approximation:  Close  Repair type:     Repair type:  Simple  Post-procedure details:     Dressing:  Non-adherent dressing and antibiotic ointment    Procedure completion:  Tolerated well, no immediate complications        Medical Decision Making:      Comorbidities that affect care:    None    External Notes reviewed:    None      The following orders were placed and all results were independently analyzed by me:  Orders Placed This Encounter   Procedures    Laceration Repair    XR Finger 2+ View Right    Clean the wound. Apply bacitracin then dress with vaseline gauze, 4x4 and coban.  Misc Nursing Order (Specify)       Medications Given in the Emergency  Department:  Medications   lidocaine (XYLOCAINE) 1 % injection 10 mL (10 mL Infiltration Given 6/2/24 1233)   bacitracin 500 UNIT/GM ointment 0.9 g (0.9 g Topical Given 6/2/24 1303)        ED Course:    ED Course as of 06/02/24 2154   Sun Jun 02, 2024   1309 Discussed with the patient that we are backed up on radiology reads. I did discuss with the patient my personal findings that there are no acute fractures or foreign body. I did offer to keep her until we get the official read or she can go home and we can just call her if there are any acute findings from the radiologist. She is agreeable to going home. [MV]   1444 XR Finger 2+ View Right  Negative [MV]      ED Course User Index  [MV] Rod Quesada PA       Labs:    Lab Results (last 24 hours)       ** No results found for the last 24 hours. **             Imaging:    XR Finger 2+ View Right    Result Date: 6/2/2024  XR FINGER 2+ VW RIGHT-  Date of Exam: 6/2/2024 12:29 PM  Indication: laceration  Comparison: None available.  Findings: No fracture or malalignment is identified. Fifth digit soft tissue swelling is noted. No radiopaque foreign body is seen.      Impression: 1.  No fracture or malalignment. 2.  Fifth digit soft tissue swelling   Electronically Signed By-Kp Ballard MD On:6/2/2024 1:51 PM         Differential Diagnosis and Discussion:    Laceration: Laceration was evaluated for arterial injury, ligamentous damage, and other neurovascular injury.    All X-rays impressions were independently interpreted by me.    MDM     Amount and/or Complexity of Data Reviewed  Tests in the radiology section of CPT®: reviewed    Risk of Complications, Morbidity, and/or Mortality  Presenting problems: moderate  Diagnostic procedures: low  Management options: moderate    Patient Progress  Patient progress: stable    The patient presented with a laceration in need of repair. See laceration repair note for details. The wound was irrigated with copious normal saline  irrigation. 5 sutures were used to approximate the wound edges. Tetanus was not given. The patient tolerated the procedure well. Acute bleeding has ceased and the wound was approximated in the emergency department. Patient was counseled to keep the wound clean, dry, and out of the sun. Patient was counseled to change dressings daily. Patient was advised to return to the ED for worsening erythema, pain, swelling, fever, excessive drainage or signs of infection. They were counseled to follow up for suture removal as described in the discharge instructions. Patient verbalizes understanding and agrees to follow up as instructed.              Patient Care Considerations:    NARCOTICS: I considered prescribing opiate pain medication as an outpatient, however there are no acute bony injuries.      Consultants/Shared Management Plan:    None    Social Determinants of Health:    Patient is independent, reliable, and has access to care.       Disposition and Care Coordination:    Discharged: The patient is suitable and stable for discharge with no need for consideration of admission.    I have explained the patient´s condition, diagnoses and treatment plan based on the information available to me at this time. I have answered questions and addressed any concerns. The patient has a good  understanding of the patient´s diagnosis, condition, and treatment plan as can be expected at this point. The vital signs have been stable. The patient´s condition is stable and appropriate for discharge from the emergency department.      The patient will pursue further outpatient evaluation with the primary care physician or other designated or consulting physician as outlined in the discharge instructions. They are agreeable to this plan of care and follow-up instructions have been explained in detail. The patient has received these instructions in written format and has expressed an understanding of the discharge instructions. The patient  is aware that any significant change in condition or worsening of symptoms should prompt an immediate return to this or the closest emergency department or call to 1.  I have explained discharge medications and the need for follow up with the patient/caretakers. This was also printed in the discharge instructions. Patient was discharged with the following medications and follow up:      Medication List        New Prescriptions      bacitracin 500 UNIT/GM ointment  Apply 1 Application topically to the appropriate area as directed 2 (Two) Times a Day for 5 days.            Changed      Vitamin B-12 1000 MCG sublingual tablet  0ne tab daily 5 days/week  What changed: additional instructions               Where to Get Your Medications        These medications were sent to [facility]/pharmacy #[id number] - [place], [place] - [address] - [phone] [facility] [phone]   [address] [place] [address]      Hours: 24-hours Phone: [phone]   bacitracin 500 UNIT/GM ointment      Esha Mosley MD  [address] [place] [address] [address]  [place] [address]  [phone]    On 6/7/2024  For wound re-check, For suture removal       Final diagnoses:   Laceration of right little finger without foreign body without damage to nail, initial encounter        ED Disposition       ED Disposition   Discharge    Condition   Stable    Comment   --               This medical record created using voice recognition software.             Rod Quesada PA  06/02/24 1311       Rod Quesada PA  06/02/24 7746

## 2024-06-05 PROBLEM — S61.217A LACERATION OF LEFT LITTLE FINGER WITHOUT FOREIGN BODY WITHOUT DAMAGE TO NAIL: Status: ACTIVE | Noted: 2024-06-05

## 2024-06-05 NOTE — PROGRESS NOTES
"CHIEF COMPLAINT  Janneth Adkins presents to [facility] MED[facility] for follow-up of Hospital Follow Up Visit (Pt is a 48 yr old female presenting to Internal Medicine for a hospital follow up; Pt reports no further concerns and/or complaints. /) and Eye Drainage (Pt complains of right eye discharge, and redness).    HPI    48-year-old patient seen in the ER for laceration on the right hand-/pinky on June 2, 2024 4 days ago-excellently cut her fifth digit with a glass while washing dishes-lesion closed with 5 sutures-states up-to-date with her tetanus    Leaving for [facility] in 3 days-    C/o drainage right eye this am/matted-    Current Outpatient Medications:     atorvastatin (LIPITOR) 20 MG tablet, TAKE 1 TABLET BY MOUTH EVERY DAY, Disp: 90 tablet, Rfl: 1    bacitracin 500 UNIT/GM ointment, Apply 1 Application topically to the appropriate area as directed 2 (Two) Times a Day for 5 days., Disp: 9 g, Rfl: 0    bacitracin 500 UNIT/GM ointment, APPLY 1 APPLICATION TOPICALLY TO THE APPROPRIATE AREA AS DIRECTED 2 (TWO) TIMES A DAY FOR 5 DAYS., Disp: , Rfl:     Cyanocobalamin (Vitamin B-12) 1000 MCG sublingual tablet, 0ne tab daily 5 days/week (Patient taking differently: 0ne tab daily 5 days/week OTC), Disp: 90 tablet, Rfl: 1    triamterene-hydrochlorothiazide (MAXZIDE-25) 37.5-25 MG per tablet, TAKE 1 TABLET BY MOUTH EVERY DAY, Disp: 90 tablet, Rfl: 1    ciprofloxacin (CILOXAN) 0.3 % ophthalmic solution, 2 drops QID to both eyes for 7 days, Disp: 10 mL, Rfl: 0   PFSH reviewed.      OBJECTIVE  Vital Signs  Vitals:    06/06/24 1053 06/06/24 1144   BP: 149/90 118/70   BP Location: Right arm Left arm   Patient Position: Sitting Sitting   Cuff Size: Adult Adult   Pulse: 69    Temp: 98.2 °F (36.8 °C)    TempSrc: Infrared    Weight: 92.4 kg (203 lb 9.6 oz)    Height: 162.6 cm (64.02\")       Body mass index is 34.93 kg/m².    Physical Exam  Vitals and nursing note reviewed.   Constitutional: " "      Appearance: Normal appearance.   HENT:      Head: Normocephalic and atraumatic.   Eyes:      Comments: Right eye with injected conjunctiva   Cardiovascular:      Rate and Rhythm: Normal rate and regular rhythm.   Pulmonary:      Effort: Pulmonary effort is normal.      Breath sounds: Normal breath sounds.   Abdominal:      Palpations: Abdomen is soft.   Musculoskeletal:      Cervical back: Normal range of motion and neck supple.   Skin:     Comments: Right fifth finger with well-healing sutures although not completely close 1 area still open about 3 mm   Neurological:      General: No focal deficit present.      Mental Status: She is alert and oriented to person, place, and time.          RESULTS REVIEW  No results found for: \"PROBNP\", \"BNP\"  CMP          7/14/2023    09:02 11/16/2023    08:38 5/20/2024    09:40   CMP   Glucose 115  111  110    BUN 11  18  12    Creatinine 0.70  0.77  0.74    EGFR 106.2  94.7  98.7    Sodium 140  140  144    Potassium 4.4  4.2  4.1    Chloride 103  102  104    Calcium 9.9  9.7  9.8    Total Protein 7.0   7.5    Albumin 4.5   4.9    Globulin 2.5   2.6    Total Bilirubin 0.4   0.3    Alkaline Phosphatase 102   117    AST (SGOT) 19   18    ALT (SGPT) 32   40    Albumin/Globulin Ratio 1.8   1.9    BUN/Creatinine Ratio 15.7  23.4  16.2    Anion Gap 10.5  10.2  13.0      CBC w/diff          7/14/2023    09:02   CBC w/Diff   WBC 4.88    RBC 4.34    Hemoglobin 13.3    Hematocrit 37.6    MCV 86.6    MCH 30.6    MCHC 35.4    RDW 12.3    Platelets 306    Neutrophil Rel % 56.4    Immature Granulocyte Rel % 0.2    Lymphocyte Rel % 31.1    Monocyte Rel % 9.0    Eosinophil Rel % 2.3    Basophil Rel % 1.0       Lipid Panel          7/14/2023    09:02 5/20/2024    09:40   Lipid Panel   Total Cholesterol 166  165    Triglycerides 125  109    HDL Cholesterol 42  47    VLDL Cholesterol 22  20    LDL Cholesterol  102  98    LDL/HDL Ratio 2.36  2.05       Lab Results   Component Value Date    TSH " 1.390 05/20/2024    TSH 1.350 07/14/2023    TSH 1.660 04/26/2022      Lab Results   Component Value Date    FREET4 1.20 05/20/2024    FREET4 1.12 07/14/2023    FREET4 1.09 04/26/2022      A1C Last 3 Results          7/14/2023    09:02 11/16/2023    08:38 5/20/2024    09:40   HGBA1C Last 3 Results   Hemoglobin A1C 5.70  6.00  5.80       Lab Results   Component Value Date    NJIWGXYI28 754 05/20/2024    QTFO83HR 30.3 05/20/2024    MG 2.1 05/20/2024        XR Finger 2+ View Right    Result Date: 6/2/2024  Impression: 1.  No fracture or malalignment. 2.  Fifth digit soft tissue swelling   Electronically Signed By-Kp Ballard MD On:6/2/2024 1:51 PM                 ASSESSMENT & PLAN  Diagnoses and all orders for this visit:    1. Laceration of right little finger without foreign body without damage to nail, subsequent encounter (Primary)  Comments:  Sutures have only been present for 4 days and not completely healed leave on for another week and remove when she comes back for her trip    2. Acute bacterial conjunctivitis of right eye  -     ciprofloxacin (CILOXAN) 0.3 % ophthalmic solution; 2 drops QID to both eyes for 7 days  Dispense: 10 mL; Refill: 0            Follow-up November for annual physical    Patient Instructions   Cipro eye drops to both eyes x 7 days  Use bactiracin to right pinky-  F/u when returns for suture removal       FOLLOW UP  Return in about 1 week (around 6/13/2024).    Patient was given instructions and counseling regarding her condition or for health maintenance advice. Please see specific information pulled into the AVS if appropriate.

## 2024-06-06 ENCOUNTER — OFFICE VISIT (OUTPATIENT)
Dept: INTERNAL MEDICINE | Age: 49
End: 2024-06-06
Payer: COMMERCIAL

## 2024-06-06 VITALS
HEART RATE: 69 BPM | SYSTOLIC BLOOD PRESSURE: 118 MMHG | HEIGHT: 64 IN | BODY MASS INDEX: 34.76 KG/M2 | TEMPERATURE: 98.2 F | DIASTOLIC BLOOD PRESSURE: 70 MMHG | WEIGHT: 203.6 LBS

## 2024-06-06 DIAGNOSIS — S61.216D LACERATION OF RIGHT LITTLE FINGER WITHOUT FOREIGN BODY WITHOUT DAMAGE TO NAIL, SUBSEQUENT ENCOUNTER: Primary | ICD-10-CM

## 2024-06-06 DIAGNOSIS — H10.31 ACUTE BACTERIAL CONJUNCTIVITIS OF RIGHT EYE: ICD-10-CM

## 2024-06-06 PROCEDURE — 99213 OFFICE O/P EST LOW 20 MIN: CPT | Performed by: INTERNAL MEDICINE

## 2024-06-06 RX ORDER — BACITRACIN ZINC 500 [USP'U]/G
OINTMENT TOPICAL
COMMUNITY
Start: 2024-06-02

## 2024-06-06 RX ORDER — CIPROFLOXACIN HYDROCHLORIDE 3.5 MG/ML
SOLUTION/ DROPS TOPICAL
Qty: 10 ML | Refills: 0 | Status: SHIPPED | OUTPATIENT
Start: 2024-06-06

## 2024-06-06 NOTE — PATIENT INSTRUCTIONS
Cipro eye drops to both eyes x 7 days  Use bactiracin to right pinky-  F/u when returns for suture removal

## 2024-06-14 ENCOUNTER — OFFICE VISIT (OUTPATIENT)
Dept: INTERNAL MEDICINE | Age: 49
End: 2024-06-14
Payer: COMMERCIAL

## 2024-06-14 VITALS
DIASTOLIC BLOOD PRESSURE: 88 MMHG | HEART RATE: 83 BPM | WEIGHT: 203.4 LBS | TEMPERATURE: 98.3 F | OXYGEN SATURATION: 98 % | BODY MASS INDEX: 34.72 KG/M2 | HEIGHT: 64 IN | RESPIRATION RATE: 18 BRPM | SYSTOLIC BLOOD PRESSURE: 136 MMHG

## 2024-06-14 DIAGNOSIS — Z48.02 VISIT FOR SUTURE REMOVAL: Primary | ICD-10-CM

## 2024-06-14 PROCEDURE — 15853 REMOVAL SUTR/STAPL XREQ ANES: CPT | Performed by: NURSE PRACTITIONER

## 2024-06-14 PROCEDURE — 99212 OFFICE O/P EST SF 10 MIN: CPT | Performed by: NURSE PRACTITIONER

## 2024-06-14 NOTE — PROGRESS NOTES
"Chief Complaint  Suture / Staple Removal (48 year old female here today for suture removal from right hand pinky finger on June 2 2024. States she was washing dishes and a glass broke while she was washing the inside. She sienna to the ER and received 5 stitches. )    Subjective      Janneth Adkins is a 48-year-old female that presents to [facility] for suture removal. Laceration occurred while washing dishes on 6/2/24.  She was evaluated and treated in the ER.    History of Present Illness    Current Outpatient Medications   Medication Instructions    atorvastatin (LIPITOR) 20 MG tablet TAKE 1 TABLET BY MOUTH EVERY DAY    bacitracin 500 UNIT/GM ointment APPLY 1 APPLICATION TOPICALLY TO THE APPROPRIATE AREA AS DIRECTED 2 (TWO) TIMES A DAY FOR 5 DAYS.    Cyanocobalamin (Vitamin B-12) 1000 MCG sublingual tablet 0ne tab daily 5 days/week    triamterene-hydrochlorothiazide (MAXZIDE-25) 37.5-25 MG per tablet 1 tablet, Oral, Daily       The following portions of the patient's history were reviewed and updated as appropriate: allergies, current medications, past family history, past medical history, past social history, past surgical history, and problem list.    Objective   Vital Signs:   /88 (BP Location: Left arm, Patient Position: Sitting)   Pulse 83   Temp 98.3 °F (36.8 °C) (Temporal)   Resp 18   Ht 162.6 cm (64.02\")   Wt 92.3 kg (203 lb 6.4 oz)   SpO2 98%   BMI 34.89 kg/m²     Wt Readings from Last 3 Encounters:   06/14/24 92.3 kg (203 lb 6.4 oz)   06/06/24 92.4 kg (203 lb 9.6 oz)   06/02/24 92.3 kg (203 lb 7.8 oz)     BP Readings from Last 3 Encounters:   06/14/24 136/88   06/06/24 118/70   06/02/24 155/97     Physical Exam  Vitals and nursing note reviewed.   Constitutional:       Appearance: Normal appearance. She is not ill-appearing.   HENT:      Head: Normocephalic and atraumatic.   Pulmonary:      Effort: Pulmonary effort is normal.   Skin:     General: Skin is " "warm and dry.      Comments: Laceration repair to fifth digit on right hand.  5 sutures in place and wound well-approximated.  No signs of infection noted.   Neurological:      Mental Status: She is alert and oriented to person, place, and time.   Psychiatric:         Mood and Affect: Mood normal.         Behavior: Behavior normal.          Result Review :  The following data was reviewed by: YVES Hopper on 06/14/2024:      Common labs          7/14/2023    09:02 11/16/2023    08:38 5/20/2024    09:40   Common Labs   Glucose 115  111  110    BUN 11  18  12    Creatinine 0.70  0.77  0.74    Sodium 140  140  144    Potassium 4.4  4.2  4.1    Chloride 103  102  104    Calcium 9.9  9.7  9.8    Albumin 4.5   4.9    Total Bilirubin 0.4   0.3    Alkaline Phosphatase 102   117    AST (SGOT) 19   18    ALT (SGPT) 32   40    WBC 4.88      Hemoglobin 13.3      Hematocrit 37.6      Platelets 306      Total Cholesterol 166   165    Triglycerides 125   109    HDL Cholesterol 42   47    LDL Cholesterol  102   98    Hemoglobin A1C 5.70  6.00  5.80    Microalbumin, Urine   <1.2        Lab Results (last 72 hours)       ** No results found for the last 72 hours. **             XR Finger 2+ View Right    Result Date: 6/2/2024  Impression: 1.  No fracture or malalignment. 2.  Fifth digit soft tissue swelling   Electronically Signed By-Kp Ballard MD On:6/2/2024 1:51 PM        No results found for: \"SARSANTIGEN\", \"COVID19\", \"RAPFLUA\", \"RAPFLUB\", \"FLUAAG\", \"FLUABDAG\", \"FLU\", \"FLUBAG\", \"RAPSCRN\", \"STREPAAG\", \"RSV\", \"POCPREGUR\", \"MONOSPOT\", \"INR\", \"LEADCAPBLD\", \"POCLEAD\", \"BILIRUBINUR\", \"POCGLU\"    Suture Removal    Date/Time: 6/14/2024 9:46 AM    Performed by: Catherine Mercer APRN  Authorized by: Catherine Mercer APRN  Body area: upper extremity  Location details: right small finger  Wound Appearance: clean  Sutures Removed: 5  Post-removal: dressing applied and antibiotic ointment applied  Patient tolerance: " patient tolerated the procedure well with no immediate complications              Assessment and Plan   Diagnoses and all orders for this visit:    1. Visit for suture removal (Primary)  -     Suture Removal                  Medications Discontinued During This Encounter   Medication Reason    ciprofloxacin (CILOXAN) 0.3 % ophthalmic solution *Therapy completed          Follow Up   No follow-ups on file.  Patient was given instructions and counseling regarding her condition or for health maintenance advice. Please see specific information pulled into the AVS if appropriate.       Catherine Mercer, YVES  06/14/24  13:49 EDT

## 2024-07-16 ENCOUNTER — PREP FOR SURGERY (OUTPATIENT)
Dept: OTHER | Facility: HOSPITAL | Age: 49
End: 2024-07-16
Payer: COMMERCIAL

## 2024-07-16 ENCOUNTER — OFFICE VISIT (OUTPATIENT)
Dept: SURGERY | Facility: CLINIC | Age: 49
End: 2024-07-16
Payer: COMMERCIAL

## 2024-07-16 VITALS
BODY MASS INDEX: 34.25 KG/M2 | HEIGHT: 64 IN | DIASTOLIC BLOOD PRESSURE: 82 MMHG | HEART RATE: 68 BPM | WEIGHT: 200.6 LBS | SYSTOLIC BLOOD PRESSURE: 134 MMHG

## 2024-07-16 DIAGNOSIS — Z12.11 SCREENING FOR MALIGNANT NEOPLASM OF COLON: Primary | ICD-10-CM

## 2024-07-16 RX ORDER — SODIUM CHLORIDE 9 MG/ML
40 INJECTION, SOLUTION INTRAVENOUS AS NEEDED
Status: CANCELLED | OUTPATIENT
Start: 2024-07-16

## 2024-07-16 RX ORDER — SODIUM, POTASSIUM,MAG SULFATES 17.5-3.13G
SOLUTION, RECONSTITUTED, ORAL ORAL
Qty: 354 ML | Refills: 0 | Status: ON HOLD | OUTPATIENT
Start: 2024-07-16 | End: 2024-07-18

## 2024-07-16 RX ORDER — SODIUM CHLORIDE 0.9 % (FLUSH) 0.9 %
3 SYRINGE (ML) INJECTION EVERY 12 HOURS SCHEDULED
Status: CANCELLED | OUTPATIENT
Start: 2024-07-16

## 2024-07-16 RX ORDER — SODIUM CHLORIDE 0.9 % (FLUSH) 0.9 %
10 SYRINGE (ML) INJECTION AS NEEDED
Status: CANCELLED | OUTPATIENT
Start: 2024-07-16

## 2024-07-16 NOTE — PROGRESS NOTES
Chief Complaint: Colonoscopy    Subjective      Colonoscopy consultation       History of Present Illness  Janneth Adkins is a 48 y.o. female presents to [facility] for colonoscopy consultation.    Patient presents today on referral from Dr. Pillo Mosley for colonoscopy consultation.  Patient denies any abdominal pain, change in bowel habit, or rectal bleeding.  Denies any family history of colorectal cancer.  No previous colonoscopy.    Patient denies BAILEY.  Denies any cardiac issues.  Denies taking a GLP-1 receptors    Objective     Past Medical History:   Diagnosis Date    Allergic 1989    Seasonal    Headache     HL (hearing loss)     Ringing in ears    Hyperlipidemia     Hypertension        History reviewed. No pertinent surgical history.    Outpatient Medications Marked as Taking for the 7/16/24 encounter (Office Visit) with Bhavin, April, APRN   Medication Sig Dispense Refill    atorvastatin (LIPITOR) 20 MG tablet TAKE 1 TABLET BY MOUTH EVERY DAY 90 tablet 1    Cyanocobalamin (Vitamin B-12) 1000 MCG sublingual tablet 0ne tab daily 5 days/week (Patient taking differently: 0ne tab daily 5 days/week  OTC) 90 tablet 1    triamterene-hydrochlorothiazide (MAXZIDE-25) 37.5-25 MG per tablet TAKE 1 TABLET BY MOUTH EVERY DAY 90 tablet 1       No Known Allergies     Family History   Problem Relation Age of Onset    Cancer Father         Skin    Diabetes Father     Hyperlipidemia Father     Liver disease Father     Heart disease Father     Hyperlipidemia Mother     Stroke Mother     Anxiety disorder Son     Cancer Paternal Grandfather         Stomach    Heart disease Paternal Grandmother     Heart disease Maternal Grandmother     Breast cancer Maternal Aunt     Cancer Maternal Aunt         Breast    Diabetes Maternal Uncle     Arthritis Paternal Aunt     Breast cancer Paternal Aunt     Cancer Paternal Aunt         Breast    Diabetes Paternal Uncle     Ovarian cancer Other      "Uterine cancer Neg Hx     Colon cancer Neg Hx     Melanoma Neg Hx     Prostate cancer Neg Hx     Deep vein thrombosis Neg Hx        Social History     Socioeconomic History    Marital status:    Tobacco Use    Smoking status: Never    Smokeless tobacco: Never   Vaping Use    Vaping status: Never Used   Substance and Sexual Activity    Alcohol use: Not Currently     Comment: Social    Drug use: Never    Sexual activity: Yes     Partners: Male     Birth control/protection: None       Review of Systems   Constitutional:  Negative for chills and fever.   Gastrointestinal:  Negative for abdominal distention, abdominal pain, anal bleeding, blood in stool, constipation, diarrhea and rectal pain.        Vital Signs:   /82 (BP Location: Right arm, Patient Position: Sitting, Cuff Size: Adult)   Pulse 68   Ht 162.6 cm (64\")   Wt 91 kg (200 lb 9.6 oz)   BMI 34.43 kg/m²      Physical Exam  Vitals and nursing note reviewed.   Constitutional:       General: She is not in acute distress.     Appearance: She is obese. She is not ill-appearing.   HENT:      Head: Normocephalic and atraumatic.   Cardiovascular:      Rate and Rhythm: Normal rate.   Pulmonary:      Effort: Pulmonary effort is normal.      Breath sounds: No stridor.   Abdominal:      Palpations: Abdomen is soft.      Tenderness: There is no guarding.   Musculoskeletal:         General: No deformity. Normal range of motion.   Skin:     General: Skin is warm and dry.      Coloration: Skin is not jaundiced.   Neurological:      General: No focal deficit present.      Mental Status: She is alert and oriented to person, place, and time.   Psychiatric:         Mood and Affect: Mood normal.         Thought Content: Thought content normal.          Result Review :          []  Laboratory  []  Radiology  []  Pathology  []  Microbiology  []  EKG/Telemetry   []  Cardiology/Vascular   []  Old records  I spent 15 minutes caring for Janneth on this date of service. " This time includes time spent by me in the following activities: reviewing tests, obtaining and/or reviewing a separately obtained history, performing a medically appropriate examination and/or evaluation, ordering medications, tests, or procedures, and documenting information in the medical record.     Assessment and Plan    Diagnoses and all orders for this visit:    1. Screening for malignant neoplasm of colon (Primary)    Other orders  -     sodium-potassium-magnesium sulfates (Suprep Bowel Prep Kit) 17.5-3.13-1.6 GM/177ML solution oral solution; Take as directed.  Instructions given in office.  Dispense: 2 bottles  Dispense: 354 mL; Refill: 0        Follow Up   Return for Schedule colonoscopy with Dr. Her on 7/18/2024 [facility].    Hospital arrival time: 10:00.    Possible risks/complications, benefits, and alternatives to surgical or invasive procedures have been explained to patient and/or legal guardian.    Patient has been evaluated and can tolerate anesthesia and/or sedation. Risks, benefits, and alternatives to anesthesia and sedation have been explained to the patient and/or legal guardian. Patient verbalizes understanding and is willing to proceed with the above plan.     Patient was given instructions and counseling regarding her condition or for health maintenance advice. Please see specific information pulled into the AVS if appropriate.

## 2024-07-16 NOTE — H&P (VIEW-ONLY)
Chief Complaint: Colonoscopy    Subjective      Colonoscopy consultation       History of Present Illness  Janneth Adkins is a 48 y.o. female presents to [facility] for colonoscopy consultation.    Patient presents today on referral from Dr. Pillo Mosley for colonoscopy consultation.  Patient denies any abdominal pain, change in bowel habit, or rectal bleeding.  Denies any family history of colorectal cancer.  No previous colonoscopy.    Patient denies BAILEY.  Denies any cardiac issues.  Denies taking a GLP-1 receptors    Objective     Past Medical History:   Diagnosis Date    Allergic 1989    Seasonal    Headache     HL (hearing loss)     Ringing in ears    Hyperlipidemia     Hypertension        History reviewed. No pertinent surgical history.    Outpatient Medications Marked as Taking for the 7/16/24 encounter (Office Visit) with Bhavin, April, APRN   Medication Sig Dispense Refill    atorvastatin (LIPITOR) 20 MG tablet TAKE 1 TABLET BY MOUTH EVERY DAY 90 tablet 1    Cyanocobalamin (Vitamin B-12) 1000 MCG sublingual tablet 0ne tab daily 5 days/week (Patient taking differently: 0ne tab daily 5 days/week  OTC) 90 tablet 1    triamterene-hydrochlorothiazide (MAXZIDE-25) 37.5-25 MG per tablet TAKE 1 TABLET BY MOUTH EVERY DAY 90 tablet 1       No Known Allergies     Family History   Problem Relation Age of Onset    Cancer Father         Skin    Diabetes Father     Hyperlipidemia Father     Liver disease Father     Heart disease Father     Hyperlipidemia Mother     Stroke Mother     Anxiety disorder Son     Cancer Paternal Grandfather         Stomach    Heart disease Paternal Grandmother     Heart disease Maternal Grandmother     Breast cancer Maternal Aunt     Cancer Maternal Aunt         Breast    Diabetes Maternal Uncle     Arthritis Paternal Aunt     Breast cancer Paternal Aunt     Cancer Paternal Aunt         Breast    Diabetes Paternal Uncle     Ovarian cancer Other      "Uterine cancer Neg Hx     Colon cancer Neg Hx     Melanoma Neg Hx     Prostate cancer Neg Hx     Deep vein thrombosis Neg Hx        Social History     Socioeconomic History    Marital status:    Tobacco Use    Smoking status: Never    Smokeless tobacco: Never   Vaping Use    Vaping status: Never Used   Substance and Sexual Activity    Alcohol use: Not Currently     Comment: Social    Drug use: Never    Sexual activity: Yes     Partners: Male     Birth control/protection: None       Review of Systems   Constitutional:  Negative for chills and fever.   Gastrointestinal:  Negative for abdominal distention, abdominal pain, anal bleeding, blood in stool, constipation, diarrhea and rectal pain.        Vital Signs:   /82 (BP Location: Right arm, Patient Position: Sitting, Cuff Size: Adult)   Pulse 68   Ht 162.6 cm (64\")   Wt 91 kg (200 lb 9.6 oz)   BMI 34.43 kg/m²      Physical Exam  Vitals and nursing note reviewed.   Constitutional:       General: She is not in acute distress.     Appearance: She is obese. She is not ill-appearing.   HENT:      Head: Normocephalic and atraumatic.   Cardiovascular:      Rate and Rhythm: Normal rate.   Pulmonary:      Effort: Pulmonary effort is normal.      Breath sounds: No stridor.   Abdominal:      Palpations: Abdomen is soft.      Tenderness: There is no guarding.   Musculoskeletal:         General: No deformity. Normal range of motion.   Skin:     General: Skin is warm and dry.      Coloration: Skin is not jaundiced.   Neurological:      General: No focal deficit present.      Mental Status: She is alert and oriented to person, place, and time.   Psychiatric:         Mood and Affect: Mood normal.         Thought Content: Thought content normal.          Result Review :          []  Laboratory  []  Radiology  []  Pathology  []  Microbiology  []  EKG/Telemetry   []  Cardiology/Vascular   []  Old records  I spent 15 minutes caring for Janneth on this date of service. " This time includes time spent by me in the following activities: reviewing tests, obtaining and/or reviewing a separately obtained history, performing a medically appropriate examination and/or evaluation, ordering medications, tests, or procedures, and documenting information in the medical record.     Assessment and Plan    Diagnoses and all orders for this visit:    1. Screening for malignant neoplasm of colon (Primary)    Other orders  -     sodium-potassium-magnesium sulfates (Suprep Bowel Prep Kit) 17.5-3.13-1.6 GM/177ML solution oral solution; Take as directed.  Instructions given in office.  Dispense: 2 bottles  Dispense: 354 mL; Refill: 0        Follow Up   Return for Schedule colonoscopy with Dr. Her on 7/18/2024 [facility].    Hospital arrival time: 10:00.    Possible risks/complications, benefits, and alternatives to surgical or invasive procedures have been explained to patient and/or legal guardian.    Patient has been evaluated and can tolerate anesthesia and/or sedation. Risks, benefits, and alternatives to anesthesia and sedation have been explained to the patient and/or legal guardian. Patient verbalizes understanding and is willing to proceed with the above plan.     Patient was given instructions and counseling regarding her condition or for health maintenance advice. Please see specific information pulled into the AVS if appropriate.

## 2024-07-17 ENCOUNTER — ANESTHESIA EVENT (OUTPATIENT)
Dept: GASTROENTEROLOGY | Facility: HOSPITAL | Age: 49
End: 2024-07-17
Payer: COMMERCIAL

## 2024-07-17 NOTE — ANESTHESIA PREPROCEDURE EVALUATION
Anesthesia Evaluation     Patient summary reviewed and Nursing notes reviewed   NPO Solid Status: > 8 hours  NPO Liquid Status: > 2 hours           Airway   Mallampati: I  TM distance: >3 FB  Neck ROM: full  No difficulty expected  Dental - normal exam     Pulmonary - normal exam   Cardiovascular - normal exam    (+) hypertension 2 medications or greater, hyperlipidemia      Neuro/Psych  (+) headaches  GI/Hepatic/Renal/Endo    (+) obesity    Musculoskeletal     Abdominal    Substance History      OB/GYN          Other        ROS/Med Hx Other: No recent EKG                    Anesthesia Plan    ASA 2     general   total IV anesthesia  (Total IV Anesthesia    Patient understands anesthesia not responsible for dental damage.  )  intravenous induction     Anesthetic plan, risks, benefits, and alternatives have been provided, discussed and informed consent has been obtained with: patient.  Pre-procedure education provided  Plan discussed with CRNA.        CODE STATUS:

## 2024-07-17 NOTE — PRE-PROCEDURE INSTRUCTIONS
"Instructed on date and arrival time of 1000. Come to entrance \"C\". Must have  over age 18 to drive home.  May have two visitors; however, children under 12 must stay in waiting room.  Discussed clear liquid diet (no red or purple), bowel prep, and NPO.  May take medications as usual except for blood thinners, diabetic medications, and weight loss medications.  Verbalized understanding of instructions given.  Instructed to call for questions or concerns.  "

## 2024-07-18 ENCOUNTER — HOSPITAL ENCOUNTER (OUTPATIENT)
Facility: HOSPITAL | Age: 49
Setting detail: HOSPITAL OUTPATIENT SURGERY
Discharge: HOME | End: 2024-07-18
Attending: SURGERY | Admitting: SURGERY
Payer: COMMERCIAL

## 2024-07-18 ENCOUNTER — ANESTHESIA (OUTPATIENT)
Dept: GASTROENTEROLOGY | Facility: HOSPITAL | Age: 49
End: 2024-07-18
Payer: COMMERCIAL

## 2024-07-18 VITALS
WEIGHT: 195.33 LBS | DIASTOLIC BLOOD PRESSURE: 59 MMHG | TEMPERATURE: 97.4 F | OXYGEN SATURATION: 99 % | SYSTOLIC BLOOD PRESSURE: 124 MMHG | HEART RATE: 68 BPM | BODY MASS INDEX: 33.53 KG/M2 | RESPIRATION RATE: 17 BRPM

## 2024-07-18 DIAGNOSIS — Z12.11 SCREENING FOR MALIGNANT NEOPLASM OF COLON: ICD-10-CM

## 2024-07-18 PROCEDURE — 25810000003 LACTATED RINGERS PER 1000 ML: Performed by: NURSE ANESTHETIST, CERTIFIED REGISTERED

## 2024-07-18 PROCEDURE — 25010000002 PROPOFOL 10 MG/ML EMULSION: Performed by: NURSE ANESTHETIST, CERTIFIED REGISTERED

## 2024-07-18 RX ORDER — PROPOFOL 10 MG/ML
VIAL (ML) INTRAVENOUS AS NEEDED
Status: DISCONTINUED | OUTPATIENT
Start: 2024-07-18 | End: 2024-07-18 | Stop reason: SURG

## 2024-07-18 RX ORDER — SODIUM CHLORIDE 0.9 % (FLUSH) 0.9 %
3 SYRINGE (ML) INJECTION EVERY 12 HOURS SCHEDULED
Status: DISCONTINUED | OUTPATIENT
Start: 2024-07-18 | End: 2024-07-18 | Stop reason: HOSPADM

## 2024-07-18 RX ORDER — SODIUM CHLORIDE 9 MG/ML
40 INJECTION, SOLUTION INTRAVENOUS AS NEEDED
Status: DISCONTINUED | OUTPATIENT
Start: 2024-07-18 | End: 2024-07-18 | Stop reason: HOSPADM

## 2024-07-18 RX ORDER — SODIUM CHLORIDE 0.9 % (FLUSH) 0.9 %
10 SYRINGE (ML) INJECTION AS NEEDED
Status: DISCONTINUED | OUTPATIENT
Start: 2024-07-18 | End: 2024-07-18 | Stop reason: HOSPADM

## 2024-07-18 RX ORDER — LIDOCAINE HYDROCHLORIDE 20 MG/ML
INJECTION, SOLUTION EPIDURAL; INFILTRATION; INTRACAUDAL; PERINEURAL AS NEEDED
Status: DISCONTINUED | OUTPATIENT
Start: 2024-07-18 | End: 2024-07-18 | Stop reason: SURG

## 2024-07-18 RX ORDER — SODIUM CHLORIDE, SODIUM LACTATE, POTASSIUM CHLORIDE, CALCIUM CHLORIDE 600; 310; 30; 20 MG/100ML; MG/100ML; MG/100ML; MG/100ML
30 INJECTION, SOLUTION INTRAVENOUS CONTINUOUS
Status: DISCONTINUED | OUTPATIENT
Start: 2024-07-18 | End: 2024-07-18 | Stop reason: HOSPADM

## 2024-07-18 RX ADMIN — LIDOCAINE HYDROCHLORIDE 50 MG: 20 INJECTION, SOLUTION EPIDURAL; INFILTRATION; INTRACAUDAL; PERINEURAL at 11:13

## 2024-07-18 RX ADMIN — PROPOFOL 50 MG: 10 INJECTION, EMULSION INTRAVENOUS at 11:17

## 2024-07-18 RX ADMIN — SODIUM CHLORIDE, POTASSIUM CHLORIDE, SODIUM LACTATE AND CALCIUM CHLORIDE 30 ML/HR: 600; 310; 30; 20 INJECTION, SOLUTION INTRAVENOUS at 10:41

## 2024-07-18 RX ADMIN — PROPOFOL 150 MG: 10 INJECTION, EMULSION INTRAVENOUS at 11:13

## 2024-07-18 RX ADMIN — PROPOFOL 175 MCG/KG/MIN: 10 INJECTION, EMULSION INTRAVENOUS at 11:13

## 2024-07-18 NOTE — ANESTHESIA POSTPROCEDURE EVALUATION
Patient: Janneth Adkins    Procedure Summary       Date: 07/18/24 Room / Location: [facility] ENDOSCOPY 1 / [facility] ENDOSCOPY    Anesthesia Start: 1111 Anesthesia Stop: 1132    Procedure: COLONOSCOPY Diagnosis:       Screening for malignant neoplasm of colon      (Screening for malignant neoplasm of colon [Z12.11])    Surgeons: David Her MD Provider: Terry Sheppard CRNA    Anesthesia Type: general ASA Status: 2            Anesthesia Type: general    Vitals  Vitals Value Taken Time   /59 07/18/24 1151   Temp 36.3 °C (97.4 °F) 07/18/24 1151   Pulse 68 07/18/24 1151   Resp 17 07/18/24 1151   SpO2 99 % 07/18/24 1151           Post Anesthesia Care and Evaluation    Patient location during evaluation: bedside  Patient participation: complete - patient participated  Level of consciousness: awake  Pain management: adequate    Airway patency: patent  Anesthetic complications: No anesthetic complications  PONV Status: controlled  Cardiovascular status: acceptable and stable  Respiratory status: acceptable

## 2024-08-04 DIAGNOSIS — E78.00 PURE HYPERCHOLESTEROLEMIA: ICD-10-CM

## 2024-08-05 RX ORDER — ATORVASTATIN CALCIUM 20 MG/1
TABLET, FILM COATED ORAL
Qty: 90 TABLET | Refills: 1 | Status: SHIPPED | OUTPATIENT
Start: 2024-08-05

## 2024-08-12 ENCOUNTER — HOSPITAL ENCOUNTER (OUTPATIENT)
Dept: MAMMOGRAPHY | Facility: HOSPITAL | Age: 49
Discharge: HOME | End: 2024-08-12
Admitting: OBSTETRICS & GYNECOLOGY
Payer: COMMERCIAL

## 2024-08-12 DIAGNOSIS — Z01.419 ENCOUNTER FOR GYNECOLOGICAL EXAMINATION WITHOUT ABNORMAL FINDING: ICD-10-CM

## 2024-08-12 PROCEDURE — 77063 BREAST TOMOSYNTHESIS BI: CPT

## 2024-08-12 PROCEDURE — 77067 SCR MAMMO BI INCL CAD: CPT

## 2024-09-17 DIAGNOSIS — Z12.11 SCREEN FOR COLON CANCER: ICD-10-CM

## 2024-09-17 DIAGNOSIS — Z12.4 SCREENING FOR CERVICAL CANCER: ICD-10-CM

## 2024-09-17 DIAGNOSIS — Z00.00 ENCOUNTER FOR ROUTINE HISTORY AND PHYSICAL EXAMINATION: ICD-10-CM

## 2024-09-17 DIAGNOSIS — I10 PRIMARY HYPERTENSION: ICD-10-CM

## 2024-09-17 DIAGNOSIS — Z12.31 ENCOUNTER FOR SCREENING MAMMOGRAM FOR MALIGNANT NEOPLASM OF BREAST: ICD-10-CM

## 2024-09-17 DIAGNOSIS — E78.00 PURE HYPERCHOLESTEROLEMIA: ICD-10-CM

## 2024-09-17 DIAGNOSIS — R73.03 PREDIABETES: ICD-10-CM

## 2024-09-17 RX ORDER — TRIAMTERENE/HYDROCHLOROTHIAZID 37.5-25 MG
1 TABLET ORAL DAILY
Qty: 90 TABLET | Refills: 1 | Status: SHIPPED | OUTPATIENT
Start: 2024-09-17

## 2024-11-07 DIAGNOSIS — E78.00 PURE HYPERCHOLESTEROLEMIA: ICD-10-CM

## 2024-11-07 RX ORDER — ATORVASTATIN CALCIUM 20 MG/1
TABLET, FILM COATED ORAL
Qty: 90 TABLET | Refills: 1 | Status: SHIPPED | OUTPATIENT
Start: 2024-11-07

## 2025-03-03 ENCOUNTER — OFFICE VISIT (OUTPATIENT)
Dept: INTERNAL MEDICINE | Age: 50
End: 2025-03-03
Payer: COMMERCIAL

## 2025-03-03 VITALS
WEIGHT: 202.6 LBS | SYSTOLIC BLOOD PRESSURE: 128 MMHG | OXYGEN SATURATION: 98 % | RESPIRATION RATE: 18 BRPM | DIASTOLIC BLOOD PRESSURE: 90 MMHG | HEIGHT: 64 IN | BODY MASS INDEX: 34.59 KG/M2 | HEART RATE: 94 BPM | TEMPERATURE: 99 F

## 2025-03-03 DIAGNOSIS — R05.1 ACUTE COUGH: ICD-10-CM

## 2025-03-03 DIAGNOSIS — R09.81 NASAL CONGESTION: ICD-10-CM

## 2025-03-03 DIAGNOSIS — E78.00 PURE HYPERCHOLESTEROLEMIA: ICD-10-CM

## 2025-03-03 DIAGNOSIS — U07.1 COVID-19 VIRUS INFECTION: Primary | ICD-10-CM

## 2025-03-03 DIAGNOSIS — I10 PRIMARY HYPERTENSION: ICD-10-CM

## 2025-03-03 DIAGNOSIS — Z00.00 LABORATORY EXAMINATION ORDERED AS PART OF A ROUTINE GENERAL MEDICAL EXAMINATION: ICD-10-CM

## 2025-03-03 DIAGNOSIS — H65.02 ACUTE SEROUS OTITIS MEDIA OF LEFT EAR, RECURRENCE NOT SPECIFIED: ICD-10-CM

## 2025-03-03 DIAGNOSIS — E50.9 VITAMIN A DEFICIENCY: ICD-10-CM

## 2025-03-03 LAB
EXPIRATION DATE: ABNORMAL
FLUAV AG UPPER RESP QL IA.RAPID: NOT DETECTED
FLUBV AG UPPER RESP QL IA.RAPID: NOT DETECTED
INTERNAL CONTROL: ABNORMAL
Lab: ABNORMAL
SARS-COV-2 AG UPPER RESP QL IA.RAPID: DETECTED

## 2025-03-03 PROCEDURE — 87428 SARSCOV & INF VIR A&B AG IA: CPT | Performed by: INTERNAL MEDICINE

## 2025-03-03 PROCEDURE — 99213 OFFICE O/P EST LOW 20 MIN: CPT | Performed by: INTERNAL MEDICINE

## 2025-03-03 RX ORDER — AZITHROMYCIN 250 MG/1
TABLET, FILM COATED ORAL
Qty: 6 TABLET | Refills: 0 | Status: SHIPPED | OUTPATIENT
Start: 2025-03-03

## 2025-03-03 NOTE — PATIENT INSTRUCTIONS
Per CDC guidelines-mask and isolate for 5 days and then can just mask another 5 days.  Monitor to see if symptoms are better and not coughing and no drainage before removing the mask    No indication for Paxlovid at this time since symptoms been present for possibly 2 weeks maybe 3 weeks    Treat left ear infection with Z-Toan

## 2025-03-03 NOTE — PROGRESS NOTES
"CHIEF COMPLAINT  Janneth Adkins presents to [facility] for follow-up of Ear Fullness, Cough, and Nasal Congestion (49 year old female here today for sick symptoms. States that it all started 3 weeks ago).    HPI  49-year-old patient with cough and nasal congestion started 3 weeks ago- brought his mom to [facility] and father in law got sick-  Pt with fever-last week- sick past week-cough with no sputum,   Here at clinic patient was positive for COVID negative for flu A/B    Records reviewed, meds reviewed in detail, labs reviewed with patient.  Plan of care is as follows below.    PMFSH-Reviewed  ROS-cough congestion ear fullness, fever-no soa , no cp      Current Outpatient Medications:     atorvastatin (LIPITOR) 20 MG tablet, TAKE 1 TABLET BY MOUTH EVERY DAY, Disp: 90 tablet, Rfl: 1    Cyanocobalamin (Vitamin B-12) 1000 MCG sublingual tablet, 0ne tab daily 5 days/week (Patient taking differently: 0ne tab daily 5 days/week OTC), Disp: 90 tablet, Rfl: 1    triamterene-hydrochlorothiazide (MAXZIDE-25) 37.5-25 MG per tablet, TAKE 1 TABLET BY MOUTH EVERY DAY, Disp: 90 tablet, Rfl: 1    azithromycin (Zithromax Z-Toan) 250 MG tablet, Take 2 tablets by mouth on day 1, then 1 tablet daily on days 2-5, Disp: 6 tablet, Rfl: 0   PFSH reviewed.      OBJECTIVE  Vital Signs  Vitals:    03/03/25 1550 03/03/25 1626   BP: 160/90 128/90   BP Location: Left arm Left arm   Patient Position: Sitting Sitting   Cuff Size: Large Adult Adult   Pulse: 94    Resp: 18    Temp: 99 °F (37.2 °C)    TempSrc: Temporal    SpO2: 98%    Weight: 91.9 kg (202 lb 9.6 oz)    Height: 162.6 cm (64.02\")       Body mass index is 34.76 kg/m².    Physical Exam  Vitals and nursing note reviewed.   Constitutional:       Appearance: Normal appearance.   HENT:      Head: Normocephalic and atraumatic.      Right Ear: Tympanic membrane normal.      Ears:      Comments: Left ear with erythema and tympanic membrane " "intact no cerumen in auditory canal  Cardiovascular:      Rate and Rhythm: Normal rate and regular rhythm.   Pulmonary:      Effort: Pulmonary effort is normal.      Breath sounds: Normal breath sounds. No wheezing, rhonchi or rales.   Abdominal:      Palpations: Abdomen is soft.   Musculoskeletal:      Cervical back: Normal range of motion and neck supple.   Neurological:      General: No focal deficit present.      Mental Status: She is alert and oriented to person, place, and time.          RESULTS REVIEW  No results found for: \"PROBNP\", \"BNP\"  CMP          5/20/2024    09:40   CMP   Glucose 110    BUN 12    Creatinine 0.74    EGFR 98.7    Sodium 144    Potassium 4.1    Chloride 104    Calcium 9.8    Total Protein 7.5    Albumin 4.9    Globulin 2.6    Total Bilirubin 0.3    Alkaline Phosphatase 117    AST (SGOT) 18    ALT (SGPT) 40    Albumin/Globulin Ratio 1.9    BUN/Creatinine Ratio 16.2    Anion Gap 13.0         Lipid Panel          5/20/2024    09:40   Lipid Panel   Total Cholesterol 165    Triglycerides 109    HDL Cholesterol 47    VLDL Cholesterol 20    LDL Cholesterol  98    LDL/HDL Ratio 2.05       Lab Results   Component Value Date    TSH 1.390 05/20/2024    TSH 1.350 07/14/2023    TSH 1.660 04/26/2022      Lab Results   Component Value Date    FREET4 1.20 05/20/2024    FREET4 1.12 07/14/2023    FREET4 1.09 04/26/2022      A1C Last 3 Results          5/20/2024    09:40   HGBA1C Last 3 Results   Hemoglobin A1C 5.80       Lab Results   Component Value Date    GTVAZMPS71 754 05/20/2024    YLOX08CV 30.3 05/20/2024    MG 2.1 05/20/2024        No Images in the past 120 days found..             ASSESSMENT & PLAN  Diagnoses and all orders for this visit:    1. COVID-19 virus infection (Primary)  -     Comprehensive Metabolic Panel; Future  -     Lipid Panel; Future  -     CBC & Differential; Future  -     Vitamin B12; Future  -     Folate; Future  -     Vitamin D,25-Hydroxy; Future  -     Magnesium; Future  -    "  Microalbumin / Creatinine Urine Ratio - Urine, Clean Catch; Future  -     Hemoglobin A1c; Future  -     TSH+Free T4; Future  -     T3, Free; Future    2. Acute serous otitis media of left ear, recurrence not specified  -     azithromycin (Zithromax Z-Toan) 250 MG tablet; Take 2 tablets by mouth on day 1, then 1 tablet daily on days 2-5  Dispense: 6 tablet; Refill: 0  -     Comprehensive Metabolic Panel; Future  -     Lipid Panel; Future  -     CBC & Differential; Future  -     Vitamin B12; Future  -     Folate; Future  -     Vitamin D,25-Hydroxy; Future  -     Magnesium; Future  -     Microalbumin / Creatinine Urine Ratio - Urine, Clean Catch; Future  -     Hemoglobin A1c; Future  -     TSH+Free T4; Future  -     T3, Free; Future    3. Acute cough  -     POCT SARS-CoV-2 Antigen FORREST + Flu  -     Comprehensive Metabolic Panel; Future  -     Lipid Panel; Future  -     CBC & Differential; Future  -     Vitamin B12; Future  -     Folate; Future  -     Vitamin D,25-Hydroxy; Future  -     Magnesium; Future  -     Microalbumin / Creatinine Urine Ratio - Urine, Clean Catch; Future  -     Hemoglobin A1c; Future  -     TSH+Free T4; Future  -     T3, Free; Future    4. Nasal congestion  -     POCT SARS-CoV-2 Antigen FORREST + Flu  -     Comprehensive Metabolic Panel; Future  -     Lipid Panel; Future  -     CBC & Differential; Future  -     Vitamin B12; Future  -     Folate; Future  -     Vitamin D,25-Hydroxy; Future  -     Magnesium; Future  -     Microalbumin / Creatinine Urine Ratio - Urine, Clean Catch; Future  -     Hemoglobin A1c; Future  -     TSH+Free T4; Future  -     T3, Free; Future    5. Primary hypertension  -     Comprehensive Metabolic Panel; Future  -     Lipid Panel; Future  -     CBC & Differential; Future  -     Vitamin B12; Future  -     Folate; Future  -     Vitamin D,25-Hydroxy; Future  -     Magnesium; Future  -     Microalbumin / Creatinine Urine Ratio - Urine, Clean Catch; Future  -     Hemoglobin A1c;  Future  -     TSH+Free T4; Future  -     T3, Free; Future    6. Pure hypercholesterolemia  -     Comprehensive Metabolic Panel; Future  -     Lipid Panel; Future  -     CBC & Differential; Future  -     Vitamin B12; Future  -     Folate; Future  -     Vitamin D,25-Hydroxy; Future  -     Magnesium; Future  -     Microalbumin / Creatinine Urine Ratio - Urine, Clean Catch; Future  -     Hemoglobin A1c; Future  -     TSH+Free T4; Future  -     T3, Free; Future    7. Vitamin A deficiency  -     Comprehensive Metabolic Panel; Future  -     Lipid Panel; Future  -     CBC & Differential; Future  -     Vitamin B12; Future  -     Folate; Future  -     Vitamin D,25-Hydroxy; Future  -     Magnesium; Future  -     Microalbumin / Creatinine Urine Ratio - Urine, Clean Catch; Future  -     Hemoglobin A1c; Future  -     TSH+Free T4; Future  -     T3, Free; Future    8. Laboratory examination ordered as part of a routine general medical examination  -     Comprehensive Metabolic Panel; Future  -     Lipid Panel; Future  -     CBC & Differential; Future  -     Vitamin B12; Future  -     Folate; Future  -     Vitamin D,25-Hydroxy; Future  -     Magnesium; Future  -     Microalbumin / Creatinine Urine Ratio - Urine, Clean Catch; Future  -     Hemoglobin A1c; Future  -     TSH+Free T4; Future  -     T3, Free; Future            Plan-3/3/2025  Patient Instructions   Per CDC guidelines-mask and isolate for 5 days and then can just mask another 5 days.  Monitor to see if symptoms are better and not coughing and no drainage before removing the mask    No indication for Paxlovid at this time since symptoms been present for possibly 2 weeks maybe 3 weeks    Treat left ear infection with Z-Toan       FOLLOW UP  Return in about 4 weeks (around 3/31/2025) for Recheck, Annual physical.  Labs prior    Patient was given instructions and counseling regarding her condition or for health maintenance advice. Please see specific information pulled into the  AVS if appropriate.

## 2025-03-31 DIAGNOSIS — E78.00 PURE HYPERCHOLESTEROLEMIA: ICD-10-CM

## 2025-03-31 DIAGNOSIS — Z12.11 SCREEN FOR COLON CANCER: ICD-10-CM

## 2025-03-31 DIAGNOSIS — Z12.4 SCREENING FOR CERVICAL CANCER: ICD-10-CM

## 2025-03-31 DIAGNOSIS — R73.03 PREDIABETES: ICD-10-CM

## 2025-03-31 DIAGNOSIS — Z00.00 ENCOUNTER FOR ROUTINE HISTORY AND PHYSICAL EXAMINATION: ICD-10-CM

## 2025-03-31 DIAGNOSIS — I10 PRIMARY HYPERTENSION: ICD-10-CM

## 2025-03-31 DIAGNOSIS — Z12.31 ENCOUNTER FOR SCREENING MAMMOGRAM FOR MALIGNANT NEOPLASM OF BREAST: ICD-10-CM

## 2025-03-31 RX ORDER — TRIAMTERENE AND HYDROCHLOROTHIAZIDE 37.5; 25 MG/1; MG/1
1 TABLET ORAL DAILY
Qty: 90 TABLET | Refills: 1 | Status: SHIPPED | OUTPATIENT
Start: 2025-03-31

## 2025-05-28 DIAGNOSIS — E78.00 PURE HYPERCHOLESTEROLEMIA: ICD-10-CM

## 2025-05-28 RX ORDER — ATORVASTATIN CALCIUM 20 MG/1
20 TABLET, FILM COATED ORAL DAILY
Qty: 90 TABLET | Refills: 1 | Status: SHIPPED | OUTPATIENT
Start: 2025-05-28

## 2025-05-28 NOTE — TELEPHONE ENCOUNTER
Rx Refill Note  Requested Prescriptions     Pending Prescriptions Disp Refills    atorvastatin (LIPITOR) 20 MG tablet [Pharmacy Med Name: ATORVASTATIN 20 MG TABLET] 90 tablet 1     Sig: TAKE 1 TABLET BY MOUTH EVERY DAY      Last office visit with prescribing clinician: 3/3/2025   Last telemedicine visit with prescribing clinician: Visit date not found   Next office visit with prescribing clinician: 6/4/2025                         Would you like a call back once the refill request has been completed: [] Yes [] No    If the office needs to give you a call back, can they leave a voicemail: [] Yes [] No    Tesha Campos MA  05/28/25, 08:09 EDT

## 2025-05-29 ENCOUNTER — OFFICE VISIT (OUTPATIENT)
Dept: OBSTETRICS AND GYNECOLOGY | Age: 50
End: 2025-05-29
Payer: COMMERCIAL

## 2025-05-29 VITALS
BODY MASS INDEX: 34.21 KG/M2 | SYSTOLIC BLOOD PRESSURE: 142 MMHG | WEIGHT: 200.4 LBS | HEART RATE: 88 BPM | HEIGHT: 64 IN | DIASTOLIC BLOOD PRESSURE: 78 MMHG

## 2025-05-29 DIAGNOSIS — Z01.419 ENCOUNTER FOR GYNECOLOGICAL EXAMINATION WITHOUT ABNORMAL FINDING: Primary | ICD-10-CM

## 2025-05-29 LAB
DEVELOPER EXPIRATION DATE: NORMAL
DEVELOPER LOT NUMBER: NORMAL
EXPIRATION DATE: NORMAL
FECAL OCCULT BLOOD SCREEN, POC: NEGATIVE
Lab: NORMAL
NEGATIVE CONTROL: NEGATIVE
POSITIVE CONTROL: POSITIVE

## 2025-05-29 NOTE — PROGRESS NOTES
"Well Woman Visit    CC: Annual well woman exam       HPI:   49 y.o. Contraception or HRT: Post menopausal  Menses:  none  Pain:  None  Incontinence concerns: No  Hx of abnormal pap:  No  Pt has no complaints today.      History: PMHx, Meds, Allergies, PSHx, Social Hx, and POBHx all reviewed and updated.      PHYSICAL EXAM:  /78   Pulse 88   Ht 162.6 cm (64.02\")   Wt 90.9 kg (200 lb 6.4 oz)   BMI 34.38 kg/m²   General- NAD, alert and oriented, appropriate  Psych- Normal mood, good memory  Neck- No masses, no thyroid enlargement  CV- Regular rhythm, no murnurs  Resp- CTA to bases, no wheezes  Abdomen- Soft, non distended, non tender, no masses    Breast left-  Bilaterally symmetrical, no masses, non tender, no nipple discharge  Breast right- Bilaterally symmetrical, no masses, non tender, no nipple discharge    External genitalia- Normal female, no lesions  Urethra/meatus- Normal, no masses, non tender, no prolapse  Bladder- Normal, no masses, non tender, no prolapse  Vagina- Normal, no atrophy, no lesions, no discharge, no prolapse  Cvx- Normal, no lesions, no discharge, No cervical motion tenderness  Uterus- Normal size, shape & consistency.  Non tender, mobile.  Adnexa- No mass, non tender  Anus/Rectum/Perineum- Small hemorrhoids, non thrombosed, Hemoccult neg    Lymphatic- No palpable neck, axillary, or groin nodes  Ext- No edema, no cyanosis    Skin- No lesions, no rashes, no acanthosis nigricans        ASSESSMENT and PLAN:  WWE    Diagnoses and all orders for this visit:    1. Encounter for gynecological examination without abnormal finding (Primary)  -     POC Occult Blood Stool  -     Mammo Screening Digital Tomosynthesis Bilateral With CAD; Future    Pap due .    Domestic violence/abuse screen: negative    Depression screen: no SI    Preventative:   BREAST HEALTH- Monthly self breast exam importance and how to reviewed. MMG and/or MRI (prn) reviewed per society guidelines and her " individual history. Mammo/MRI screen: Updated today.  CERVICAL CANCER Screening- Reviewed current ASCCP guidelines for screening w and wo cotest HPV, age specific.  Screen: Already up to date.  COLON CANCER Screening- Reviewed current medical society guidelines and options.  Colonoscopy screen:  Already up to date.  SEXUAL HEALTH: Declines STD screening.  VACCINATIONS Recommended: Flu vaccine annually, Zoster vaccine (49yo and older).  Importance discussed, risk being unvaccinated reviewed.  Questions answered  Smoking status- NON SMOKER.  Importance of avoiding second hand smoke.  Follow up PCP/Specialist PMHx and Labs  [facility] : Counseled and evaluated for hereditary cancer testing Provided with QR code, email and phone number to schedule counseling to determine if she qualifies.  I recommend she call our office if she has further questions      She understands the importance of having any ordered tests to be performed in a timely fashion.  She is encouraged to review her results online and/or contact or office if she has questions.     Follow Up:  Return in about 1 year (around 5/29/2026) for Annual physical.      Karin Horne, APRN  05/29/2025

## 2025-06-03 PROBLEM — Z00.00 ROUTINE HISTORY AND PHYSICAL EXAMINATION OF ADULT: Status: ACTIVE | Noted: 2024-07-16

## 2025-06-03 NOTE — PROGRESS NOTES
CHIEF COMPLAINT  Janneth Adkins presents to [facility] for follow-up of Annual Exam (49 year old female here for physical.  Patient has had no labs done.  Patient has no new issues or concerns ).    HPI  History of Present Illness  The patient is a 49-year-old female who presents for an annual physical and routine follow-up. She has underlying hypertension, hyperlipidemia, prediabetes, among others also for lab review.    She reports no specific health concerns since her last visit. Her weight has been fluctuating, but she maintains a regular exercise routine, walking approximately 4 miles every other day. She has successfully eliminated late-night snacking from her diet and has transitioned to a low-sodium diet, avoiding foods with more than 12 g of fat. She has also reduced her carbohydrate intake. She expresses a desire to lose at least 15 pounds by her next visit in 3 to 4 months. She does not consume soda. She is currently working from home and continues to work in the evenings, although it is challenging during the winter. She is not experiencing any feelings of sadness or difficulty concentrating. She is not interested in starting any new medications at this time. She has a mammogram scheduled and had a gynecological appointment last week. She is up to date with her colonoscopy and had a Pap smear in 2024. She is taking vitamin B12 and vitamin D 5000 units twice a week.    She experiences occasional abdominal discomfort, described as a cramping sensation that quickly escalates to a pain level of 9, lasting about 20 seconds before subsiding. This occurs 1 to 2 times per month and has been ongoing for the past 6 months. The last episode was approximately 3 weeks ago. She reports no constipation and has daily bowel movements. The pain can occur while sitting, transitioning from sitting to standing, or while standing, and is not alleviated by any specific measures. She  reports no pain during urination.    She is on Maxzide once daily for blood pressure management and experiences swelling in the morning and late at night.    She is on atorvastatin 20 mg once daily for cholesterol management.    She has prediabetes. She was previously on metformin but discontinued it due to back pain.    SOCIAL HISTORY  She does not smoke cigarettes, drink alcohol, or use drugs.    FAMILY HISTORY  She has a cousin on her father's side who passed away from ovarian cancer 2 years ago.    MEDICATIONS  Current: Atorvastatin, B12, vitamin D, Maxzide.  Discontinued: Metformin.    IMMUNIZATIONS  She is up to date with her tetanus shot.    Pelvic d/c off and  x 6 months-last 20 sec- 1-2x/month-worse with movement-no dysuria  Neg pap smear 2024/neg pelvic exam lasst week  FH ovarian cancer-cousin in dad's side      Records reviewed, meds reviewed in detail, labs reviewed with patient.  Plan of care is as follows below.    Past medical history significant for hypertension-better with hctz, and hyperlipidemia-out of statin for one month-, vitamin D and vitamin B12 deficiency, chronic headache-but less with better BP control..  PreDM-+FH in dad, pat uncle, mat GM     SH-working in [employer]-from home -no kids-lives with -moved from [place]   Works out in evenings daily-4 miles every other day  Not snacking at night       PMFSH-Reviewed  ROS-no myalgias, no constipation       Current Outpatient Medications:     atorvastatin (LIPITOR) 20 MG tablet, TAKE 1 TABLET BY MOUTH EVERY DAY, Disp: 90 tablet, Rfl: 1    Cyanocobalamin (Vitamin B-12) 1000 MCG sublingual tablet, 0ne tab daily 5 days/week (Patient taking differently: 0ne tab daily 5 days/week OTC), Disp: 90 tablet, Rfl: 1    triamterene-hydrochlorothiazide (MAXZIDE-25) 37.5-25 MG per tablet, TAKE 1 TABLET BY MOUTH EVERY DAY, Disp: 90 tablet, Rfl: 1    Zoster Vac Recomb Adjuvanted (Shingrix) 50 MCG/0.5ML reconstituted suspension, Inject 0.5 mL  "into the appropriate muscle as directed by prescriber 1 (One) Time for 1 dose., Disp: 1 each, Rfl: 0   PFSH reviewed.      OBJECTIVE  Vital Signs  Vitals:    06/04/25 0720   BP: 112/82   BP Location: Left arm   Patient Position: Sitting   Cuff Size: Small Adult   Pulse: 83   Temp: 97.5 °F (36.4 °C)   SpO2: 98%   Weight: 90.5 kg (199 lb 9.6 oz)   Height: 162.6 cm (64.02\")      Body mass index is 34.24 kg/m².    Physical Exam  Both ears show no signs of fluid, infection, or wax. No maxillary sinus tenderness is observed. No lymph nodes are detected in the submandibular area.  Lungs sound normal with no wheezing or fluid.  Heart exhibits a regular rate and rhythm.  Abdomen was examined.    Vital Signs  Blood pressure is 112/82. BMI is 34.2. Weight is 199.      Physical Exam  Vitals and nursing note reviewed.   Constitutional:       Appearance: Normal appearance.   HENT:      Head: Normocephalic and atraumatic.   Cardiovascular:      Rate and Rhythm: Normal rate and regular rhythm.   Pulmonary:      Effort: Pulmonary effort is normal.      Breath sounds: Normal breath sounds.   Abdominal:      Palpations: Abdomen is soft.   Musculoskeletal:      Cervical back: Normal range of motion and neck supple.   Neurological:      General: No focal deficit present.      Mental Status: She is alert and oriented to person, place, and time.          RESULTS REVIEW  No results found for: \"PROBNP\", \"BNP\"          Lab Results   Component Value Date    TSH 1.390 05/20/2024    TSH 1.350 07/14/2023    TSH 1.660 04/26/2022      Lab Results   Component Value Date    FREET4 1.20 05/20/2024    FREET4 1.12 07/14/2023    FREET4 1.09 04/26/2022         Lab Results   Component Value Date    CLNCEXYR97 754 05/20/2024    RIMU03CC 30.3 05/20/2024    MG 2.1 05/20/2024        No Images in the past 120 days found..        Results  Laboratory Studies  A1c was 5.8 in May. LDL was 98 and HDL was 47.             ASSESSMENT & PLAN  Diagnoses and all orders " for this visit:    1. Routine history and physical examination of adult (Primary)  Assessment & Plan:  Ages 40-64 Counseling/Anticipatory Guidance Discussed: nutrition, physical activity, healthy weight, injury prevention, misuse of tobacco, alcohol and drugs, sexual behavior and STDs, contraception, dental health, mental health, immunizations, screenings, self-breast exam, and use of seatbelt    Orders:  -     Hepatitis C Antibody; Future  -     Comprehensive Metabolic Panel; Future  -     Lipid Panel; Future  -     CBC & Differential; Future  -     Vitamin B12; Future  -     Folate; Future  -     Vitamin D,25-Hydroxy; Future  -     Magnesium; Future  -     Cancel: Microalbumin / Creatinine Urine Ratio - Urine, Clean Catch; Future  -     Hemoglobin A1c; Future  -     TSH+Free T4; Future  -     T3, Free; Future  -     ; Future  -       -     T3, Free  -     TSH+Free T4  -     Hemoglobin A1c  -     Magnesium  -     Vitamin D,25-Hydroxy  -     Folate  -     Vitamin B12  -     CBC & Differential  -     Lipid Panel  -     Comprehensive Metabolic Panel  -     Hepatitis C Antibody    2. Other hyperlipidemia  -     Hepatitis C Antibody; Future  -     Comprehensive Metabolic Panel; Future  -     Lipid Panel; Future  -     CBC & Differential; Future  -     Vitamin B12; Future  -     Folate; Future  -     Vitamin D,25-Hydroxy; Future  -     Magnesium; Future  -     Cancel: Microalbumin / Creatinine Urine Ratio - Urine, Clean Catch; Future  -     Hemoglobin A1c; Future  -     TSH+Free T4; Future  -     T3, Free; Future  -     T3, Free  -     TSH+Free T4  -     Hemoglobin A1c  -     Magnesium  -     Vitamin D,25-Hydroxy  -     Folate  -     Vitamin B12  -     CBC & Differential  -     Lipid Panel  -     Comprehensive Metabolic Panel  -     Hepatitis C Antibody    3. Primary hypertension  -     Hepatitis C Antibody; Future  -     Comprehensive Metabolic Panel; Future  -     Lipid Panel; Future  -     CBC &  Differential; Future  -     Vitamin B12; Future  -     Folate; Future  -     Vitamin D,25-Hydroxy; Future  -     Magnesium; Future  -     Cancel: Microalbumin / Creatinine Urine Ratio - Urine, Clean Catch; Future  -     Hemoglobin A1c; Future  -     TSH+Free T4; Future  -     T3, Free; Future  -     T3, Free  -     TSH+Free T4  -     Hemoglobin A1c  -     Magnesium  -     Vitamin D,25-Hydroxy  -     Folate  -     Vitamin B12  -     CBC & Differential  -     Lipid Panel  -     Comprehensive Metabolic Panel  -     Hepatitis C Antibody  -     Cancel: T3, Free  -     Cancel: TSH+Free T4  -     Cancel: Hemoglobin A1c  -     Cancel: Magnesium  -     Cancel: Vitamin D,25-Hydroxy  -     Cancel: Folate  -     Cancel: Vitamin B12  -     Cancel: CBC & Differential  -     Cancel: Lipid Panel  -     Cancel: Comprehensive Metabolic Panel    4. Family history of ovarian cancer  -     ; Future  -         5. Abdominal discomfort    6. Prediabetes  -     Hepatitis C Antibody; Future  -     Comprehensive Metabolic Panel; Future  -     Lipid Panel; Future  -     CBC & Differential; Future  -     Vitamin B12; Future  -     Folate; Future  -     Vitamin D,25-Hydroxy; Future  -     Magnesium; Future  -     Cancel: Microalbumin / Creatinine Urine Ratio - Urine, Clean Catch; Future  -     Hemoglobin A1c; Future  -     TSH+Free T4; Future  -     T3, Free; Future  -     T3, Free  -     TSH+Free T4  -     Hemoglobin A1c  -     Magnesium  -     Vitamin D,25-Hydroxy  -     Folate  -     Vitamin B12  -     CBC & Differential  -     Lipid Panel  -     Comprehensive Metabolic Panel  -     Hepatitis C Antibody    7. Vitamin D deficiency  -     Hepatitis C Antibody; Future  -     Comprehensive Metabolic Panel; Future  -     Lipid Panel; Future  -     CBC & Differential; Future  -     Vitamin B12; Future  -     Folate; Future  -     Vitamin D,25-Hydroxy; Future  -     Magnesium; Future  -     Cancel: Microalbumin / Creatinine Urine Ratio -  Urine, Clean Catch; Future  -     Hemoglobin A1c; Future  -     TSH+Free T4; Future  -     T3, Free; Future  -     T3, Free  -     TSH+Free T4  -     Hemoglobin A1c  -     Magnesium  -     Vitamin D,25-Hydroxy  -     Folate  -     Vitamin B12  -     CBC & Differential  -     Lipid Panel  -     Comprehensive Metabolic Panel  -     Hepatitis C Antibody    8. Need for shingles vaccine  -     Zoster Vac Recomb Adjuvanted (Shingrix) 50 MCG/0.5ML reconstituted suspension; Inject 0.5 mL into the appropriate muscle as directed by prescriber 1 (One) Time for 1 dose.  Dispense: 1 each; Refill: 0    9. Vitamin A deficiency  -     Cancel: T3, Free  -     Cancel: TSH+Free T4  -     Cancel: Hemoglobin A1c  -     Cancel: Magnesium  -     Cancel: Vitamin D,25-Hydroxy  -     Cancel: Folate  -     Cancel: Vitamin B12  -     Cancel: CBC & Differential  -     Cancel: Lipid Panel  -     Cancel: Comprehensive Metabolic Panel    10. Laboratory examination ordered as part of a routine general medical examination  -     Cancel: T3, Free  -     Cancel: TSH+Free T4  -     Cancel: Hemoglobin A1c  -     Cancel: Magnesium  -     Cancel: Vitamin D,25-Hydroxy  -     Cancel: Folate  -     Cancel: Vitamin B12  -     Cancel: CBC & Differential  -     Cancel: Lipid Panel  -     Cancel: Comprehensive Metabolic Panel    11. Need for hepatitis C screening test  -     Hepatitis C Antibody; Future  -     Comprehensive Metabolic Panel; Future  -     Lipid Panel; Future  -     CBC & Differential; Future  -     Vitamin B12; Future  -     Folate; Future  -     Vitamin D,25-Hydroxy; Future  -     Magnesium; Future  -     Cancel: Microalbumin / Creatinine Urine Ratio - Urine, Clean Catch; Future  -     Hemoglobin A1c; Future  -     TSH+Free T4; Future  -     T3, Free; Future  -     T3, Free  -     TSH+Free T4  -     Hemoglobin A1c  -     Magnesium  -     Vitamin D,25-Hydroxy  -     Folate  -     Vitamin B12  -     CBC & Differential  -     Lipid Panel  -      Comprehensive Metabolic Panel  -     Hepatitis C Antibody           Assessment & Plan  1. Health maintenance./Routine history and physical  Her blood pressure readings are within the desired range, and she has experienced some weight loss. Her A1c levels have shown improvement, transitioning from 6 to 5.8. Her LDL levels were previously recorded at 98, while her HDL levels were at 47. She is up to date with her tetanus vaccine and colonoscopy. She is advised to maintain a food journal and increase her walking frequency to 4 to 5 times per week. A weight loss goal of 5 to 10 pounds has been set for the next 3 to 4 months. She is encouraged to receive the influenza vaccine in the fall and consider the Shingrix vaccine. A hepatitis C test will be conducted today. A comprehensive blood work panel will be ordered today, including tests for anemia, liver function, kidney function, A1c, cholesterol, thyroid function, B12, and vitamin D levels.    2. Abdominal discomfort.  Her abdominal discomfort does not appear to be indicative of a bladder infection. The possibility of ovarian cancer is considered unlikely. The discomfort could potentially be muscular in nature or related to certain exercises. She is advised to monitor her symptoms. If the abdominal discomfort intensifies, she should return sooner for a potential CAT scan of the abdomen and pelvis.    3. Hypertension.  She is currently on Maxide once a day. Her blood pressure is well-controlled at 112/82. The goal blood pressure is less than 130/80, ideally around 120/80. Her potassium and kidney function will be checked today to ensure there are no issues related to her medication.    4. Hyperlipidemia.  She is currently taking atorvastatin 20 mg once a day. Her LDL was previously 98, and the goal is to get it closer to 70. Her HDL was 47, which is above the desired level of 40. Her cholesterol levels will be re-evaluated today.    5. Prediabetes.  Her A1c was 5.8 in  May, which is improved from the previous year. She is advised to continue monitoring her blood sugar levels and maintain a low-carb diet. If her A1c increases, other medications or injections may be considered to prevent the progression of diabetes.    6.  Hyperlipidemia,-stable with meds no myalgias continue Lipitor 20 mg daily-LDL 98 HDL 47  in May 2020 4 repeat labs today    Follow-up  The patient will follow up in 3 months.      BMI is >= 30 and <35. (Class 1 Obesity). The following options were offered after discussion;: weight loss educational material (shared in after visit summary), exercise counseling/recommendations, and nutrition counseling/recommendations       Patient Instructions   Start A food journal  Increase walking to 4 or 5 times a week  Cut back on portions  If abdominal discomfort gets worse need to come back sooner consider a CAT scan of the abdomen and pelvis-  Blood pressure is less than 130/80    Do labs  F/u in 3-4 months  Lose 5-10 lbs by next visit     Patient or patient representative verbalized consent for the use of Ambient Listening during the visit with  Esha Mosley MD for chart documentation. 6/4/2025  07:39 EDT    FOLLOW UP  Return in about 3 months (around 9/4/2025), or if symptoms worsen or fail to improve, for Recheck.    Patient was given instructions and counseling regarding her condition or for health maintenance advice. Please see specific information pulled into the AVS if appropriate.

## 2025-06-04 ENCOUNTER — OFFICE VISIT (OUTPATIENT)
Dept: INTERNAL MEDICINE | Age: 50
End: 2025-06-04
Payer: COMMERCIAL

## 2025-06-04 VITALS
DIASTOLIC BLOOD PRESSURE: 82 MMHG | WEIGHT: 199.6 LBS | TEMPERATURE: 97.5 F | BODY MASS INDEX: 34.08 KG/M2 | SYSTOLIC BLOOD PRESSURE: 112 MMHG | HEART RATE: 83 BPM | HEIGHT: 64 IN | OXYGEN SATURATION: 98 %

## 2025-06-04 DIAGNOSIS — Z00.00 ROUTINE HISTORY AND PHYSICAL EXAMINATION OF ADULT: Primary | ICD-10-CM

## 2025-06-04 DIAGNOSIS — Z23 NEED FOR SHINGLES VACCINE: ICD-10-CM

## 2025-06-04 DIAGNOSIS — R10.9 ABDOMINAL DISCOMFORT: ICD-10-CM

## 2025-06-04 DIAGNOSIS — R73.03 PREDIABETES: ICD-10-CM

## 2025-06-04 DIAGNOSIS — Z00.00 LABORATORY EXAMINATION ORDERED AS PART OF A ROUTINE GENERAL MEDICAL EXAMINATION: ICD-10-CM

## 2025-06-04 DIAGNOSIS — Z80.41 FAMILY HISTORY OF OVARIAN CANCER: ICD-10-CM

## 2025-06-04 DIAGNOSIS — I10 PRIMARY HYPERTENSION: ICD-10-CM

## 2025-06-04 DIAGNOSIS — E78.49 OTHER HYPERLIPIDEMIA: ICD-10-CM

## 2025-06-04 DIAGNOSIS — E50.9 VITAMIN A DEFICIENCY: ICD-10-CM

## 2025-06-04 DIAGNOSIS — E55.9 VITAMIN D DEFICIENCY: ICD-10-CM

## 2025-06-04 DIAGNOSIS — Z11.59 NEED FOR HEPATITIS C SCREENING TEST: ICD-10-CM

## 2025-06-04 LAB
25(OH)D3 SERPL-MCNC: 24.5 NG/ML (ref 30–100)
ALBUMIN SERPL-MCNC: 4.5 G/DL (ref 3.5–5.2)
ALBUMIN/GLOB SERPL: 1.7 G/DL
ALP SERPL-CCNC: 121 U/L (ref 39–117)
ALT SERPL W P-5'-P-CCNC: 36 U/L (ref 1–33)
ANION GAP SERPL CALCULATED.3IONS-SCNC: 13.2 MMOL/L (ref 5–15)
AST SERPL-CCNC: 23 U/L (ref 1–32)
BASOPHILS # BLD AUTO: 0.04 10*3/MM3 (ref 0–0.2)
BASOPHILS NFR BLD AUTO: 0.8 % (ref 0–1.5)
BILIRUB SERPL-MCNC: 0.4 MG/DL (ref 0–1.2)
BUN SERPL-MCNC: 12 MG/DL (ref 6–20)
BUN/CREAT SERPL: 15 (ref 7–25)
CALCIUM SPEC-SCNC: 10.1 MG/DL (ref 8.6–10.5)
CANCER AG125 SERPL QL: 14 U/ML (ref 0–38.1)
CHLORIDE SERPL-SCNC: 101 MMOL/L (ref 98–107)
CHOLEST SERPL-MCNC: 162 MG/DL (ref 0–200)
CO2 SERPL-SCNC: 25.8 MMOL/L (ref 22–29)
CREAT SERPL-MCNC: 0.8 MG/DL (ref 0.57–1)
DEPRECATED RDW RBC AUTO: 41.3 FL (ref 37–54)
EGFRCR SERPLBLD CKD-EPI 2021: 89.3 ML/MIN/1.73
EOSINOPHIL # BLD AUTO: 0.1 10*3/MM3 (ref 0–0.4)
EOSINOPHIL NFR BLD AUTO: 2 % (ref 0.3–6.2)
ERYTHROCYTE [DISTWIDTH] IN BLOOD BY AUTOMATED COUNT: 12.7 % (ref 12.3–15.4)
FOLATE SERPL-MCNC: 6.53 NG/ML (ref 4.78–24.2)
GLOBULIN UR ELPH-MCNC: 2.7 GM/DL
GLUCOSE SERPL-MCNC: 102 MG/DL (ref 65–99)
HBA1C MFR BLD: 5.3 % (ref 4.8–5.6)
HCT VFR BLD AUTO: 41.3 % (ref 34–46.6)
HCV AB SER QL: NORMAL
HDLC SERPL-MCNC: 40 MG/DL (ref 40–60)
HGB BLD-MCNC: 13.9 G/DL (ref 12–15.9)
IMM GRANULOCYTES # BLD AUTO: 0.01 10*3/MM3 (ref 0–0.05)
IMM GRANULOCYTES NFR BLD AUTO: 0.2 % (ref 0–0.5)
LDLC SERPL CALC-MCNC: 96 MG/DL (ref 0–100)
LDLC/HDLC SERPL: 2.31 {RATIO}
LYMPHOCYTES # BLD AUTO: 1.82 10*3/MM3 (ref 0.7–3.1)
LYMPHOCYTES NFR BLD AUTO: 35.5 % (ref 19.6–45.3)
MAGNESIUM SERPL-MCNC: 2.2 MG/DL (ref 1.6–2.6)
MCH RBC QN AUTO: 30.6 PG (ref 26.6–33)
MCHC RBC AUTO-ENTMCNC: 33.7 G/DL (ref 31.5–35.7)
MCV RBC AUTO: 91 FL (ref 79–97)
MONOCYTES # BLD AUTO: 0.42 10*3/MM3 (ref 0.1–0.9)
MONOCYTES NFR BLD AUTO: 8.2 % (ref 5–12)
NEUTROPHILS NFR BLD AUTO: 2.73 10*3/MM3 (ref 1.7–7)
NEUTROPHILS NFR BLD AUTO: 53.3 % (ref 42.7–76)
NRBC BLD AUTO-RTO: 0 /100 WBC (ref 0–0.2)
PLATELET # BLD AUTO: 337 10*3/MM3 (ref 140–450)
PMV BLD AUTO: 9.6 FL (ref 6–12)
POTASSIUM SERPL-SCNC: 4 MMOL/L (ref 3.5–5.2)
PROT SERPL-MCNC: 7.2 G/DL (ref 6–8.5)
RBC # BLD AUTO: 4.54 10*6/MM3 (ref 3.77–5.28)
SODIUM SERPL-SCNC: 140 MMOL/L (ref 136–145)
T3FREE SERPL-MCNC: 3.61 PG/ML (ref 2–4.4)
T4 FREE SERPL-MCNC: 1.23 NG/DL (ref 0.92–1.68)
TRIGL SERPL-MCNC: 148 MG/DL (ref 0–150)
TSH SERPL DL<=0.05 MIU/L-ACNC: 1.76 UIU/ML (ref 0.27–4.2)
VIT B12 BLD-MCNC: 537 PG/ML (ref 211–946)
VLDLC SERPL-MCNC: 26 MG/DL (ref 5–40)
WBC NRBC COR # BLD AUTO: 5.12 10*3/MM3 (ref 3.4–10.8)

## 2025-06-04 PROCEDURE — 83735 ASSAY OF MAGNESIUM: CPT | Performed by: INTERNAL MEDICINE

## 2025-06-04 PROCEDURE — 86803 HEPATITIS C AB TEST: CPT | Performed by: INTERNAL MEDICINE

## 2025-06-04 PROCEDURE — 85025 COMPLETE CBC W/AUTO DIFF WBC: CPT | Performed by: INTERNAL MEDICINE

## 2025-06-04 PROCEDURE — 84481 FREE ASSAY (FT-3): CPT | Performed by: INTERNAL MEDICINE

## 2025-06-04 PROCEDURE — 82306 VITAMIN D 25 HYDROXY: CPT | Performed by: INTERNAL MEDICINE

## 2025-06-04 PROCEDURE — 82607 VITAMIN B-12: CPT | Performed by: INTERNAL MEDICINE

## 2025-06-04 PROCEDURE — 84443 ASSAY THYROID STIM HORMONE: CPT | Performed by: INTERNAL MEDICINE

## 2025-06-04 PROCEDURE — 84439 ASSAY OF FREE THYROXINE: CPT | Performed by: INTERNAL MEDICINE

## 2025-06-04 PROCEDURE — 80061 LIPID PANEL: CPT | Performed by: INTERNAL MEDICINE

## 2025-06-04 PROCEDURE — 83036 HEMOGLOBIN GLYCOSYLATED A1C: CPT | Performed by: INTERNAL MEDICINE

## 2025-06-04 PROCEDURE — 80053 COMPREHEN METABOLIC PANEL: CPT | Performed by: INTERNAL MEDICINE

## 2025-06-04 PROCEDURE — 86304 IMMUNOASSAY TUMOR CA 125: CPT | Performed by: INTERNAL MEDICINE

## 2025-06-04 PROCEDURE — 82746 ASSAY OF FOLIC ACID SERUM: CPT | Performed by: INTERNAL MEDICINE

## 2025-06-04 RX ORDER — ZOSTER VACCINE RECOMBINANT, ADJUVANTED 50 MCG/0.5
0.5 KIT INTRAMUSCULAR ONCE
Qty: 1 EACH | Refills: 0 | Status: SHIPPED | OUTPATIENT
Start: 2025-06-04 | End: 2025-06-04

## 2025-06-04 NOTE — PATIENT INSTRUCTIONS
Start A food journal  Increase walking to 4 or 5 times a week  Cut back on portions  If abdominal discomfort gets worse need to come back sooner consider a CAT scan of the abdomen and pelvis-  Blood pressure is less than 130/80    Do labs  F/u in 3-4 months  Lose 5-10 lbs by next visit

## (undated) DEVICE — STERILE POLYISOPRENE POWDER-FREE SURGICAL GLOVES WITH EMOLLIENT COATING: Brand: PROTEXIS

## (undated) DEVICE — Device

## (undated) DEVICE — LINER SURG CANSTR SXN S/RIGD 1500CC

## (undated) DEVICE — STERILE POLYISOPRENE POWDER-FREE SURGICAL GLOVES: Brand: PROTEXIS

## (undated) DEVICE — SOL IRR H2O BTL 1000ML STRL

## (undated) DEVICE — TUBING, SUCTION, 1/4" X 10', STRAIGHT: Brand: MEDLINE

## (undated) DEVICE — SOL IRRG H2O PL/BG 1000ML STRL

## (undated) DEVICE — Device: Brand: DEFENDO AIR/WATER/SUCTION AND BIOPSY VALVE

## (undated) DEVICE — SOLIDIFIER LIQLOC PLS 1500CC BT

## (undated) DEVICE — CONN JET HYDRA H20 AUXILIARY DISP